# Patient Record
Sex: MALE | Race: WHITE | NOT HISPANIC OR LATINO | URBAN - METROPOLITAN AREA
[De-identification: names, ages, dates, MRNs, and addresses within clinical notes are randomized per-mention and may not be internally consistent; named-entity substitution may affect disease eponyms.]

---

## 2024-05-10 ENCOUNTER — INPATIENT (INPATIENT)
Facility: HOSPITAL | Age: 89
LOS: 7 days | Discharge: HOME | DRG: 392 | End: 2024-05-18
Attending: HOSPITALIST | Admitting: STUDENT IN AN ORGANIZED HEALTH CARE EDUCATION/TRAINING PROGRAM
Payer: COMMERCIAL

## 2024-05-10 VITALS
RESPIRATION RATE: 20 BRPM | SYSTOLIC BLOOD PRESSURE: 207 MMHG | OXYGEN SATURATION: 97 % | HEART RATE: 99 BPM | DIASTOLIC BLOOD PRESSURE: 87 MMHG | HEIGHT: 67 IN | WEIGHT: 141.1 LBS | TEMPERATURE: 99 F

## 2024-05-10 DIAGNOSIS — N17.9 ACUTE KIDNEY FAILURE, UNSPECIFIED: ICD-10-CM

## 2024-05-10 DIAGNOSIS — R10.9 UNSPECIFIED ABDOMINAL PAIN: ICD-10-CM

## 2024-05-10 DIAGNOSIS — E87.5 HYPERKALEMIA: ICD-10-CM

## 2024-05-10 LAB
ALBUMIN SERPL ELPH-MCNC: 3.7 G/DL — SIGNIFICANT CHANGE UP (ref 3.3–5)
ALP SERPL-CCNC: 83 U/L — SIGNIFICANT CHANGE UP (ref 40–120)
ALT FLD-CCNC: 12 U/L — SIGNIFICANT CHANGE UP (ref 10–45)
ANION GAP SERPL CALC-SCNC: 17 MMOL/L — SIGNIFICANT CHANGE UP (ref 5–17)
ANION GAP SERPL CALC-SCNC: 20 MMOL/L — HIGH (ref 5–17)
APPEARANCE UR: CLEAR — SIGNIFICANT CHANGE UP
AST SERPL-CCNC: 17 U/L — SIGNIFICANT CHANGE UP (ref 10–40)
BACTERIA # UR AUTO: NEGATIVE /HPF — SIGNIFICANT CHANGE UP
BASE EXCESS BLDV CALC-SCNC: -3.3 MMOL/L — LOW (ref -2–3)
BASOPHILS # BLD AUTO: 0 K/UL — SIGNIFICANT CHANGE UP (ref 0–0.2)
BASOPHILS NFR BLD AUTO: 0 % — SIGNIFICANT CHANGE UP (ref 0–2)
BILIRUB SERPL-MCNC: 1 MG/DL — SIGNIFICANT CHANGE UP (ref 0.2–1.2)
BILIRUB UR-MCNC: NEGATIVE — SIGNIFICANT CHANGE UP
BUN SERPL-MCNC: 77 MG/DL — HIGH (ref 7–23)
BUN SERPL-MCNC: 78 MG/DL — HIGH (ref 7–23)
CA-I SERPL-SCNC: 1.2 MMOL/L — SIGNIFICANT CHANGE UP (ref 1.15–1.33)
CALCIUM SERPL-MCNC: 9.3 MG/DL — SIGNIFICANT CHANGE UP (ref 8.4–10.5)
CALCIUM SERPL-MCNC: 9.7 MG/DL — SIGNIFICANT CHANGE UP (ref 8.4–10.5)
CAST: 2 /LPF — SIGNIFICANT CHANGE UP (ref 0–4)
CHLORIDE BLDV-SCNC: 97 MMOL/L — SIGNIFICANT CHANGE UP (ref 96–108)
CHLORIDE SERPL-SCNC: 96 MMOL/L — SIGNIFICANT CHANGE UP (ref 96–108)
CHLORIDE SERPL-SCNC: 99 MMOL/L — SIGNIFICANT CHANGE UP (ref 96–108)
CO2 BLDV-SCNC: 24 MMOL/L — SIGNIFICANT CHANGE UP (ref 22–26)
CO2 SERPL-SCNC: 16 MMOL/L — LOW (ref 22–31)
CO2 SERPL-SCNC: 16 MMOL/L — LOW (ref 22–31)
COLOR SPEC: YELLOW — SIGNIFICANT CHANGE UP
CREAT SERPL-MCNC: 5 MG/DL — HIGH (ref 0.5–1.3)
CREAT SERPL-MCNC: 5.06 MG/DL — HIGH (ref 0.5–1.3)
DIFF PNL FLD: ABNORMAL
EGFR: 10 ML/MIN/1.73M2 — LOW
EGFR: 11 ML/MIN/1.73M2 — LOW
EOSINOPHIL # BLD AUTO: 0 K/UL — SIGNIFICANT CHANGE UP (ref 0–0.5)
EOSINOPHIL NFR BLD AUTO: 0 % — SIGNIFICANT CHANGE UP (ref 0–6)
GAS PNL BLDV: 128 MMOL/L — LOW (ref 136–145)
GAS PNL BLDV: SIGNIFICANT CHANGE UP
GLUCOSE BLDV-MCNC: 136 MG/DL — HIGH (ref 70–99)
GLUCOSE SERPL-MCNC: 130 MG/DL — HIGH (ref 70–99)
GLUCOSE SERPL-MCNC: 132 MG/DL — HIGH (ref 70–99)
GLUCOSE UR QL: NEGATIVE MG/DL — SIGNIFICANT CHANGE UP
HCO3 BLDV-SCNC: 23 MMOL/L — SIGNIFICANT CHANGE UP (ref 22–29)
HCT VFR BLD CALC: 53.5 % — HIGH (ref 39–50)
HCT VFR BLDA CALC: 55 % — HIGH (ref 39–51)
HGB BLD CALC-MCNC: 18.3 G/DL — HIGH (ref 12.6–17.4)
HGB BLD-MCNC: 17.7 G/DL — HIGH (ref 13–17)
KETONES UR-MCNC: ABNORMAL MG/DL
LACTATE BLDV-MCNC: 1.5 MMOL/L — SIGNIFICANT CHANGE UP (ref 0.5–2)
LEUKOCYTE ESTERASE UR-ACNC: NEGATIVE — SIGNIFICANT CHANGE UP
LIDOCAIN IGE QN: 22 U/L — SIGNIFICANT CHANGE UP (ref 7–60)
LYMPHOCYTES # BLD AUTO: 0.14 K/UL — LOW (ref 1–3.3)
LYMPHOCYTES # BLD AUTO: 0.9 % — LOW (ref 13–44)
MANUAL SMEAR VERIFICATION: SIGNIFICANT CHANGE UP
MCHC RBC-ENTMCNC: 28.7 PG — SIGNIFICANT CHANGE UP (ref 27–34)
MCHC RBC-ENTMCNC: 33.1 GM/DL — SIGNIFICANT CHANGE UP (ref 32–36)
MCV RBC AUTO: 86.7 FL — SIGNIFICANT CHANGE UP (ref 80–100)
MONOCYTES # BLD AUTO: 1.06 K/UL — HIGH (ref 0–0.9)
MONOCYTES NFR BLD AUTO: 7 % — SIGNIFICANT CHANGE UP (ref 2–14)
NEUTROPHILS # BLD AUTO: 13.9 K/UL — HIGH (ref 1.8–7.4)
NEUTROPHILS NFR BLD AUTO: 91.2 % — HIGH (ref 43–77)
NEUTS BAND # BLD: 0.9 % — SIGNIFICANT CHANGE UP (ref 0–8)
NITRITE UR-MCNC: NEGATIVE — SIGNIFICANT CHANGE UP
PCO2 BLDV: 43 MMHG — SIGNIFICANT CHANGE UP (ref 42–55)
PH BLDV: 7.33 — SIGNIFICANT CHANGE UP (ref 7.32–7.43)
PH UR: 5.5 — SIGNIFICANT CHANGE UP (ref 5–8)
PLAT MORPH BLD: ABNORMAL
PLATELET # BLD AUTO: 174 K/UL — SIGNIFICANT CHANGE UP (ref 150–400)
PO2 BLDV: 19 MMHG — LOW (ref 25–45)
POTASSIUM BLDV-SCNC: 5.5 MMOL/L — HIGH (ref 3.5–5.1)
POTASSIUM SERPL-MCNC: 4.8 MMOL/L — SIGNIFICANT CHANGE UP (ref 3.5–5.3)
POTASSIUM SERPL-MCNC: 5.6 MMOL/L — HIGH (ref 3.5–5.3)
POTASSIUM SERPL-SCNC: 4.8 MMOL/L — SIGNIFICANT CHANGE UP (ref 3.5–5.3)
POTASSIUM SERPL-SCNC: 5.6 MMOL/L — HIGH (ref 3.5–5.3)
PROT SERPL-MCNC: 7.9 G/DL — SIGNIFICANT CHANGE UP (ref 6–8.3)
PROT UR-MCNC: 300 MG/DL
RBC # BLD: 6.17 M/UL — HIGH (ref 4.2–5.8)
RBC # FLD: 13.6 % — SIGNIFICANT CHANGE UP (ref 10.3–14.5)
RBC BLD AUTO: SIGNIFICANT CHANGE UP
RBC CASTS # UR COMP ASSIST: 18 /HPF — HIGH (ref 0–4)
SAO2 % BLDV: 32.5 % — LOW (ref 67–88)
SODIUM SERPL-SCNC: 132 MMOL/L — LOW (ref 135–145)
SODIUM SERPL-SCNC: 132 MMOL/L — LOW (ref 135–145)
SP GR SPEC: 1.01 — SIGNIFICANT CHANGE UP (ref 1–1.03)
SQUAMOUS # UR AUTO: 1 /HPF — SIGNIFICANT CHANGE UP (ref 0–5)
UROBILINOGEN FLD QL: 0.2 MG/DL — SIGNIFICANT CHANGE UP (ref 0.2–1)
WBC # BLD: 15.09 K/UL — HIGH (ref 3.8–10.5)
WBC # FLD AUTO: 15.09 K/UL — HIGH (ref 3.8–10.5)
WBC UR QL: 2 /HPF — SIGNIFICANT CHANGE UP (ref 0–5)

## 2024-05-10 PROCEDURE — 99285 EMERGENCY DEPT VISIT HI MDM: CPT

## 2024-05-10 PROCEDURE — 74176 CT ABD & PELVIS W/O CONTRAST: CPT | Mod: 26,MC

## 2024-05-10 PROCEDURE — 99223 1ST HOSP IP/OBS HIGH 75: CPT

## 2024-05-10 RX ORDER — ACETAMINOPHEN 500 MG
1000 TABLET ORAL ONCE
Refills: 0 | Status: COMPLETED | OUTPATIENT
Start: 2024-05-10 | End: 2024-05-10

## 2024-05-10 RX ORDER — LIDOCAINE HCL 20 MG/ML
5 VIAL (ML) INJECTION ONCE
Refills: 0 | Status: COMPLETED | OUTPATIENT
Start: 2024-05-10 | End: 2024-05-10

## 2024-05-10 RX ORDER — DEXTROSE 50 % IN WATER 50 %
25 SYRINGE (ML) INTRAVENOUS ONCE
Refills: 0 | Status: COMPLETED | OUTPATIENT
Start: 2024-05-10 | End: 2024-05-10

## 2024-05-10 RX ORDER — SODIUM ZIRCONIUM CYCLOSILICATE 10 G/10G
10 POWDER, FOR SUSPENSION ORAL ONCE
Refills: 0 | Status: COMPLETED | OUTPATIENT
Start: 2024-05-10 | End: 2024-05-10

## 2024-05-10 RX ORDER — SODIUM CHLORIDE 9 MG/ML
1000 INJECTION, SOLUTION INTRAVENOUS
Refills: 0 | Status: DISCONTINUED | OUTPATIENT
Start: 2024-05-10 | End: 2024-05-10

## 2024-05-10 RX ORDER — SODIUM CHLORIDE 9 MG/ML
1000 INJECTION INTRAMUSCULAR; INTRAVENOUS; SUBCUTANEOUS ONCE
Refills: 0 | Status: DISCONTINUED | OUTPATIENT
Start: 2024-05-10 | End: 2024-05-10

## 2024-05-10 RX ORDER — METRONIDAZOLE 500 MG
500 TABLET ORAL ONCE
Refills: 0 | Status: COMPLETED | OUTPATIENT
Start: 2024-05-10 | End: 2024-05-10

## 2024-05-10 RX ORDER — ALBUTEROL 90 UG/1
2.5 AEROSOL, METERED ORAL ONCE
Refills: 0 | Status: COMPLETED | OUTPATIENT
Start: 2024-05-10 | End: 2024-05-10

## 2024-05-10 RX ORDER — CALCIUM GLUCONATE 100 MG/ML
2 VIAL (ML) INTRAVENOUS ONCE
Refills: 0 | Status: COMPLETED | OUTPATIENT
Start: 2024-05-10 | End: 2024-05-10

## 2024-05-10 RX ORDER — INSULIN HUMAN 100 [IU]/ML
5 INJECTION, SOLUTION SUBCUTANEOUS ONCE
Refills: 0 | Status: COMPLETED | OUTPATIENT
Start: 2024-05-10 | End: 2024-05-10

## 2024-05-10 RX ORDER — CIPROFLOXACIN LACTATE 400MG/40ML
400 VIAL (ML) INTRAVENOUS ONCE
Refills: 0 | Status: COMPLETED | OUTPATIENT
Start: 2024-05-10 | End: 2024-05-10

## 2024-05-10 RX ORDER — SODIUM CHLORIDE 9 MG/ML
500 INJECTION INTRAMUSCULAR; INTRAVENOUS; SUBCUTANEOUS ONCE
Refills: 0 | Status: COMPLETED | OUTPATIENT
Start: 2024-05-10 | End: 2024-05-10

## 2024-05-10 RX ADMIN — Medication 5 MILLILITER(S): at 22:30

## 2024-05-10 RX ADMIN — Medication 200 MILLIGRAM(S): at 20:01

## 2024-05-10 RX ADMIN — ALBUTEROL 2.5 MILLIGRAM(S): 90 AEROSOL, METERED ORAL at 20:38

## 2024-05-10 RX ADMIN — SODIUM CHLORIDE 500 MILLILITER(S): 9 INJECTION INTRAMUSCULAR; INTRAVENOUS; SUBCUTANEOUS at 20:00

## 2024-05-10 RX ADMIN — Medication 25 MILLILITER(S): at 20:27

## 2024-05-10 RX ADMIN — Medication 200 GRAM(S): at 20:25

## 2024-05-10 RX ADMIN — Medication 100 MILLIGRAM(S): at 21:06

## 2024-05-10 RX ADMIN — SODIUM ZIRCONIUM CYCLOSILICATE 10 GRAM(S): 10 POWDER, FOR SUSPENSION ORAL at 20:26

## 2024-05-10 RX ADMIN — Medication 400 MILLIGRAM(S): at 21:13

## 2024-05-10 RX ADMIN — Medication 1000 MILLIGRAM(S): at 23:16

## 2024-05-10 RX ADMIN — INSULIN HUMAN 5 UNIT(S): 100 INJECTION, SOLUTION SUBCUTANEOUS at 20:29

## 2024-05-10 NOTE — ED PROVIDER NOTE - OBJECTIVE STATEMENT
89-year-old male with a history of hyperlipidemia, hypertension, presented with right upper quadrant pain, radiating to the right flank area nausea for the last 4 days,  he denies any history of gallbladder disease still has appendix , no surgeries before , no problem urinating

## 2024-05-10 NOTE — CONSULT NOTE ADULT - PROBLEM SELECTOR RECOMMENDATION 2
Pt. with hyperkalemia in the setting of obstructive uropathy. Serum potassium is elevated at 5.6. Castro being placed as noted above. Recommend Lokelma 10gm TID for 24 hours. Start pt. on D5W + 150 mEq of bicarb at 75ccs/hr. IVFs can be discontinued if urine output is minimal and there are increases in oxygen requirements. Recheck BMP now with above labs. Monitor SCO2.     If you have any questions, please feel free to contact me.  Fish Austin MD  Nephrology Fellow  [email or web address] / [employer] Teams (Preferred)  (After 4pm or on weekends, please call the on-call Fellow) Pt. with hyperkalemia in the setting of obstructive uropathy. Serum potassium is elevated at 5.6 and improved to 4.8 on repeat. Castro being placed as noted above. Recommend Lokelma 10gm TID for 24 hours to optimize for OR tomorrow. Recheck BMP at midnight. Monitor SCO2.     If you have any questions, please feel free to contact me.  Fish Austin MD  Nephrology Fellow  [id number] / [employer] Teams (Preferred)  (After 4pm or on weekends, please call the on-call Fellow)

## 2024-05-10 NOTE — H&P ADULT - ASSESSMENT
89M  w/pmhx HTN, BPH, HLD, p/w abdominal pain x 4 days ,   labs w/ LIA , imaging w/ R hydronephrosis

## 2024-05-10 NOTE — ED ADULT NURSE NOTE - OBJECTIVE STATEMENT
Pt is a 89y male who presents to [facility] ED from triage c/o of abd pain. Pt is primarily Occitan speaking, family member at bedside translating, endorses about 4 days ago pt developed sudden onset RUQ abd pain which has started to migrate to the epigastric/middle abd region, pt endorses feeling nausea but no episodes of emesis, and decreased appetite due to the nausea. Pt tender in abd region upon palpation. PMH HTN, HLD. Pt is A&Ox4 currently denying any SOB, cough, CP, palpitations, fever/chills, weakness/fatigue. Peripheral IV placed by ED RN in QPA, 20G in R AC and 22G in L hand. Pt placed on CM.

## 2024-05-10 NOTE — CONSULT NOTE ADULT - PROBLEM SELECTOR RECOMMENDATION 9
Pt. with LIA on CKD in the setting of obstructive uropathy from ?mass as seen on CT. No prior labs available for review on [place]. As per pt. his Scr normally ranges between 1.7 to 2. Scr on admission was elevated at 5.06 (5/10). UA with 300mg of protein, trace ketones, large blood, and 18 RBCs. CT abd/pelv on admision showed soft tissue filling defect in the right mid/distal ureter, measuring 1.5 cm, possibly neoplasm, resultant moderate right hydroureteronephrosis, and significant retroperitoneal lymphadenopathy, which is concerning for a lymphoproliferative process such as lymphoma. Castro catheter being placed by Urology in the ED. Tentatively planned for OR in AM for stent vs PCN if medically optimized. Discussed with Urology. Recommend to obtain LDH, uric acid, UPCR, and kidney US. Monitor labs and urine output. Avoid nephrotoxins. Dose medications as per eGFR.

## 2024-05-10 NOTE — CONSULT NOTE ADULT - ASSESSMENT
88y/o M with PMH of HTN, HLD, CKD, BPH s/p TURP 16 years ago at [facility] (doesn't recall urologist), and SCC presents to ED complaining of R flank abdominal pain for the past 4 days but worsening since yesterday.  CT scan with findings of a soft tissue filling defect in the right mid/distal ureter, measuring 1.5 cm, possibly neoplasm, moderate right hydroureteronephrosis, and significant retroperitoneal lymphadenopathy, which is concerning for a lymphoproliferative process such as lymphoma.  Hyperkalemia now corrected with lokelma     -Recommend IR consult for right nephrostomy tube placement  -Ureteral stent placement would be difficult in the setting of a 1.5cm mass, pt would also need medical optimization and clearance to undergo general anesthesia     -Will defer manuel placement at this time since patient is voiding well with acceptable PVR and urinary symptoms   -Continue abx given multiple manuel attempts   -Please send urine culture   -Trend BMP   -Urology will follow, please page if unable to void ([phone])    D/w Dr. Jama Gong [facility]  [address] [place] [phone]

## 2024-05-10 NOTE — H&P ADULT - NSHPREVIEWOFSYSTEMS_GEN_ALL_CORE
CONSTITUTIONAL: No weakness, fevers or chills  EYES/ENT: No visual changes;  No dysphagia  NECK: No pain or stiffness  RESPIRATORY: No cough, wheezing, hemoptysis; No shortness of breath  CARDIOVASCULAR: No chest pain or palpitations; No lower extremity edema  EXTREMITIES: no le edema, cyanosis, clubbing  GASTROINTESTINAL: +  abdominal pain. +  nausea, vomiting, or hematemesis; No diarrhea or constipation. No melena or hematochezia.  BACK: No back pain  GENITOURINARY: No dysuria, frequency or hematuria  NEUROLOGICAL: No numbness or weakness  MSK: no joint swelling or pain  SKIN: No itching, burning, rashes, or lesions   PSYCH: no agitation  All other review of systems is negative unless indicated above.

## 2024-05-10 NOTE — ED PROVIDER NOTE - CLINICAL SUMMARY MEDICAL DECISION MAKING FREE TEXT BOX
89-year-old male with right upper quadrant pain nausea for couple of days, on physical exam tender in the right upper quadrant , elevated white count 15,000,, concern for cholecystitis, pyelonephritis, appendicitis,   will obtain blood work IV hydration, antibiotics, right upper quadrant sonogram to rule out cholecystitis CAT scan of the abdomen and reassess ZR 89-year-old male with right upper quadrant pain nausea for couple of days, on physical exam tender in the right upper quadrant , elevated white count 15,000,, concern for cholecystitis, pyelonephritis,  Pancreatitis ,renal colic   less likely cardiac event ,ECG sinus tack LVH old anterior lateral mi    will obtain blood work IV hydration, antibiotics, right upper quadrant sonogram to rule out cholecystitis CAT scan of the abdomen and reassess ZR

## 2024-05-10 NOTE — ED PROVIDER NOTE - RAPID ASSESSMENT
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 Pain is constant, felt in right side abdomen radiating to right back.  Has never had pain like this before.  Saw primary doctor who sent him in to ED today for evaluation.  Symptoms are associated with nausea but no vomiting.  Additionally had constipation for the last 3 days and feels bloated which is unusual for him, normally has a BM every day.  Denies history of abdominal surgery, chest pain, shortness of breath, hematuria, urgency, dysuria, frequency, numbness/tingling, bowel/bladder incontinence.     **Patient was rapidly assessed by Yohan verdugo Kearney, PA-C. A limited history was obtained. The patient will be seen and further examined and worked up in the main ED and their care will be completed by the main ED team. Receiving team will follow up on labs, analgesia, any clinical imaging, and perform reassessment and disposition of the patient as clinically indicated. All decisions regarding the progression of care will be made at their discretion.

## 2024-05-10 NOTE — H&P ADULT - PROBLEM SELECTOR PLAN 4
Significant retroperitoneal lymphadenopathy, which is concerning for a  lymphoproliferative process such as lymphoma.   - Oncology consult - to be arranged by day team  - LDH/ Uric Acid   - Ddimer/ fibrinogen

## 2024-05-10 NOTE — H&P ADULT - PROBLEM SELECTOR PLAN 2
attempted to place manuel without success , assessed that stent placement would be difficult in the setting of a 1.5cm mass recommends IR consult for PCN   -  consult appreciated, day team to follow up further recommendations   - IR consult , consult order placed , day team to follow up   - Aztreonam , for prophylaxis due to  instrumentation

## 2024-05-10 NOTE — H&P ADULT - HISTORY OF PRESENT ILLNESS
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     Patient reports persistent right upper quadrant pain over the past four days,  radiating to the right flank area, associated with nausea but no vomiting , he also reports bloating and constipation during this time. He reports no fever or chills. 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     Patient reports persistent right upper quadrant pain over the past four days,  radiating to the right flank area, associated with nausea but no vomiting , no exacerbating or reliving factors, he also reports bloating and constipation during this time. He reports no fever or chills. Of note , patient had about 10lb unintended weight loss over the past several months

## 2024-05-10 NOTE — H&P ADULT - NSHPLABSRESULTS_GEN_ALL_CORE
Labs personally reviewed:                          17.7   15.09 )-----------( 174      ( 10 May 2024 17:52 )             53.5     05-10    132<L>  |  99  |  77<H>  ----------------------------<  132<H>  4.8   |  16<L>  |  5.00<H>    Ca    9.3      10 May 2024 19:18    TPro  7.9  /  Alb  3.7  /  TBili  1.0  /  DBili  x   /  AST  17  /  ALT  12  /  AlkPhos  83  05-10        LIVER FUNCTIONS - ( 10 May 2024 17:52 )  Alb: 3.7 g/dL / Pro: 7.9 g/dL / ALK PHOS: 83 U/L / ALT: 12 U/L / AST: 17 U/L / GGT: x             Urinalysis Basic - ( 10 May 2024 19:18 )    Color: x / Appearance: x / SG: x / pH: x  Gluc: 132 mg/dL / Ketone: x  / Bili: x / Urobili: x   Blood: x / Protein: x / Nitrite: x   Leuk Esterase: x / RBC: x / WBC x   Sq Epi: x / Non Sq Epi: x / Bacteria: x      CAPILLARY BLOOD GLUCOSE      POCT Blood Glucose.: 187 mg/dL (10 May 2024 21:08)  POCT Blood Glucose.: 122 mg/dL (10 May 2024 19:57)      Imaging:  CT AP: Soft tissue filling defect in the right mid/distal ureter, measuring 1.5   cm, possibly neoplasm. Resultant moderate right hydroureteronephrosis.    Significant retroperitoneal lymphadenopathy, which is concerning for a   lymphoproliferative process such as lymphoma.      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

## 2024-05-10 NOTE — CONSULT NOTE ADULT - SUBJECTIVE AND OBJECTIVE BOX
[facility] DIVISION OF KIDNEY DISEASES AND HYPERTENSION -- [phone]  -- INITIAL CONSULT NOTE  --------------------------------------------------------------------------------  HPI: 89M with PMH of HTN, HLD, CKD, BPH, and SCC who comes in to the ED complaining of R flank abdominal pain for the past few days. Nephrology was consulted for LIA on CKD.     No prior labs available for review on [place]. As per pt. his Scr normally ranges between 1.7 to 2. Scr on admission was elevated at 5.06 (5/10). Serum potassium is elevated at 5.6. SCO2 is low at 16.     Pt. was seen and evaluated in the ED with family at bedside. Pt. reports abdominal pain (first time) over the past few days that has not improved. He has had a 10lb weight loss over the past year without a decrease in appetite. Currently denies fevers/chills, HA, CP, SOB, dysuria, or LE swelling. As per pt, he has had a kidney US in the past (>10 years ago) and at that time was normal. CT abd/pelv on admision showed soft tissue filling defect in the right mid/distal ureter, measuring 1.5 cm, possibly neoplasm, resultant moderate right hydroureteronephrosis, and significant retroperitoneal lymphadenopathy, which is concerning for a lymphoproliferative process such as lymphoma.    PAST HISTORY  --------------------------------------------------------------------------------  PAST MEDICAL & SURGICAL HISTORY:  Hypertension  HLD (hyperlipidemia)    FAMILY HISTORY:  No family history of kidney disease/dialysis    PAST SOCIAL HISTORY:  Social smoker    ALLERGIES & MEDICATIONS  --------------------------------------------------------------------------------  Allergies  penicillin (Rash)    Intolerances    Standing Inpatient Medicationslidocaine 2% Jelly 5 milliLiter(s) IntraUrethral Once    PRN Inpatient Medications    REVIEW OF SYSTEMS  --------------------------------------------------------------------------------  Gen: No fevers/chills  Skin: No rashes  Head/Eyes/Ears: No HA  Respiratory: No dyspnea, cough  CV: No chest pain  GI: No abdominal pain, diarrhea  : No dysuria, hematuria  MSK: No edema  Heme: No easy bruising or bleeding  Psych: No significant depression    All other systems were reviewed and are negative, except as noted.    VITALS/PHYSICAL EXAM  --------------------------------------------------------------------------------  T(C): 37.1 (05-10-24 @ 16:55), Max: 37.1 (05-10-24 @ 16:55)  HR: 87 (05-10-24 @ 20:10) (87 - 99)  BP: 130/68 (05-10-24 @ 20:10) (130/68 - 207/87)  RR: 17 (05-10-24 @ 20:10) (17 - 20)  SpO2: 96% (05-10-24 @ 20:10) (96% - 97%)  Wt(kg): --  Height (cm): 170.2 (05-10-24 @ 16:55)  Weight (kg): 64 (05-10-24 @ 16:55)  BMI (kg/m2): 22.1 (05-10-24 @ 16:55)  BSA (m2): 1.74 (05-10-24 @ 16:55)    Physical Exam:  Gen: NAD  HEENT: MMM  Pulm: CTA B/L  CV: S1S2  Abd: Distended but compressible, tenderness to palpation of R flank.   Ext: No B/L LE edema  Neuro: Awake  Skin: Warm and dry, petechial rash in LE B/L    LABS/STUDIES  --------------------------------------------------------------------------------              17.7   15.09 >-----------<  174      [05-10-24 @ 17:52]              53.5     132  |  96  |  78  ----------------------------<  130      [05-10-24 @ 17:52]  5.6   |  16  |  5.06        Ca     9.7     [05-10-24 @ 17:52]    TPro  7.9  /  Alb  3.7  /  TBili  1.0  /  DBili  x   /  AST  17  /  ALT  12  /  AlkPhos  83  [05-10-24 @ 17:52]    Creatinine Trend:  SCr 5.06 [05-10 @ 17:52] [facility] DIVISION OF KIDNEY DISEASES AND HYPERTENSION -- [phone]  -- INITIAL CONSULT NOTE  --------------------------------------------------------------------------------  HPI: 89M with PMH of HTN, HLD, CKD, BPH, and SCC who comes in to the ED complaining of R flank abdominal pain for the past few days. Nephrology was consulted for LIA on CKD, hyperkalemia, and metabolic acidosis.    No prior labs available for review on [facility]. As per pt. his Scr normally ranges between 1.7 to 2. Scr on admission was elevated at 5.06 (5/10). Serum potassium is elevated at 5.6. SCO2 is low at 16.     Pt. was seen and evaluated in the ED with family at bedside. Pt. reports abdominal pain (first time) over the past few days that has not improved. He has had a 10lb weight loss over the past year without a decrease in appetite. Currently denies fevers/chills, HA, CP, SOB, dysuria, or LE swelling. As per pt, he has had a kidney US in the past (>10 years ago) and at that time was normal. CT abd/pelv on admision showed soft tissue filling defect in the right mid/distal ureter, measuring 1.5 cm, possibly neoplasm, resultant moderate right hydroureteronephrosis, and significant retroperitoneal lymphadenopathy, which is concerning for a lymphoproliferative process such as lymphoma.    PAST HISTORY  --------------------------------------------------------------------------------  PAST MEDICAL & SURGICAL HISTORY:  Hypertension  HLD (hyperlipidemia)    FAMILY HISTORY:  No family history of kidney disease/dialysis    PAST SOCIAL HISTORY:  Social smoker    ALLERGIES & MEDICATIONS  --------------------------------------------------------------------------------  Allergies  penicillin (Rash)  Intolerances    Standing Inpatient Medicationslidocaine 2% Jelly 5 milliLiter(s) IntraUrethral Once    PRN Inpatient Medications    REVIEW OF SYSTEMS  --------------------------------------------------------------------------------  All other systems were reviewed and are negative, except as noted.    VITALS/PHYSICAL EXAM  --------------------------------------------------------------------------------  T(C): 37.1 (05-10-24 @ 16:55), Max: 37.1 (05-10-24 @ 16:55)  HR: 87 (05-10-24 @ 20:10) (87 - 99)  BP: 130/68 (05-10-24 @ 20:10) (130/68 - 207/87)  RR: 17 (05-10-24 @ 20:10) (17 - 20)  SpO2: 96% (05-10-24 @ 20:10) (96% - 97%)  Wt(kg): --  Height (cm): 170.2 (05-10-24 @ 16:55)  Weight (kg): 64 (05-10-24 @ 16:55)  BMI (kg/m2): 22.1 (05-10-24 @ 16:55)  BSA (m2): 1.74 (05-10-24 @ 16:55)    Physical Exam:  Gen: NAD  HEENT: MMM  Pulm: CTA B/L  CV: S1S2  Abd: Distended but compressible, tenderness to palpation of R flank.   Ext: No B/L LE edema  Neuro: Awake  Skin: Warm and dry, petechial rash in LE B/L    LABS/STUDIES  --------------------------------------------------------------------------------              17.7   15.09 >-----------<  174      [05-10-24 @ 17:52]              53.5     132  |  96  |  78  ----------------------------<  130      [05-10-24 @ 17:52]  5.6   |  16  |  5.06        Ca     9.7     [05-10-24 @ 17:52]    TPro  7.9  /  Alb  3.7  /  TBili  1.0  /  DBili  x   /  AST  17  /  ALT  12  /  AlkPhos  83  [05-10-24 @ 17:52]    Creatinine Trend:  SCr 5.06 [05-10 @ 17:52]

## 2024-05-10 NOTE — H&P ADULT - PROBLEM SELECTOR PLAN 5
leukocytosis and erythrocytosis , platelets wnl   - monitor cell counts  - Heme/onc consult leukocytosis and erythrocytosis , platelets wnl : low suspicion for infection , UA negative , afebrile , not tachycardic , CT AP w/ no findings suggestive of infection , reports abdominal pain,  otherwise no localizing symptoms s/p cipro/flagy  - continuing abx w/ aztreonam   - monitor cell counts  - Heme/onc consult

## 2024-05-10 NOTE — CONSULT NOTE ADULT - SUBJECTIVE AND OBJECTIVE BOX
HPI: 89y/o M with PMH of HTN, HLD, CKD, BPH s/p TURP 16 years ago at [facility] (doesn't recall urologist), and SCC presents to ED complaining of R flank abdominal pain for the past 4 days but worsening since yesterday. Pain radiates to RLQ and is associated with nausea. CT scan of abd/pelv showed a soft tissue filling defect in the right mid/distal ureter, measuring 1.5 cm, possibly neoplasm, moderate right hydroureteronephrosis, and significant retroperitoneal lymphadenopathy, which is concerning for a lymphoproliferative process such as lymphoma. Left kidney is also atrophic. Labs significant for K 5.6, Na 132 and SCO2 16. Pt reports his baseline SCr is 1.6-1.7.  Nephrology following and treating hyperkalemia.   Pt reports a 10lb weight loss over the past year, his sister at bedside states he has a hearty appetite but has been eating less since yesterday due to pain. Has baseline nocturia 2-3x/night. He denies fever, chills, vomiting, dysuria, urgency, frequency or gross hematuria.     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 Pt is voiding, PVR 330cc.     PAST MEDICAL & SURGICAL HISTORY:  Hypertension    HLD (hyperlipidemia)    FAMILY HISTORY:  No known  malignancy     Social History:  Denies alcohol and drug abuse, nonsmoker, retired      MEDICATIONS  (STANDING):  lidocaine 2% Jelly 5 milliLiter(s) IntraUrethral Once  sodium chloride 0.9% Bolus 1000 milliLiter(s) IV Bolus once    MEDICATIONS  (PRN):    Allergies    penicillin (Rash)    Intolerances    REVIEW OF SYSTEMS: Pertinent positives and negatives as stated in HPI, otherwise negative    Vital signs  T(C): 37.1 (05-10-24 @ 16:55), Max: 37.1 (05-10-24 @ 16:55)  HR: 87 (05-10-24 @ 20:10)  BP: 130/68 (05-10-24 @ 20:10)  SpO2: 96% (05-10-24 @ 20:10)  Wt(kg): --    Physical Exam  Gen: NAD  HEENT: normocephalic, atraumatic, no scleral icterus  Pulm:  No respiratory distress, no subcostal retractions, no accessory muscle use   CV: S1 S2, RRR,  no JVD  Abd: Soft, NT, ND, no rebound tenderness or guarding  : Circumcised, no lesions.  No discharge or blood at urethral meatus.  Testes descended bilaterally.  Testes and epididymis nontender bilaterally.   MSK:  No CVAT, Moving all extremities, full ROM in all extremities, No edema present  NEURO: A&Ox3, no focal neurological deficits, CN 2-12 grossly intact  SKIN: warm, dry, no rash.    LABS:  CBC                       17.7   15.09 )-----------( 174      ( 10 May 2024 17:52 )             53.5     BMP   05-10    132<L>  |  99  |  77<H>  ----------------------------<  132<H>  4.8   |  16<L>  |  5.00<H>    Ca    9.3      10 May 2024 19:18    TPro  7.9  /  Alb  3.7  /  TBili  1.0  /  DBili  x   /  AST  17  /  ALT  12  /  AlkPhos  83  05-10    Urinalysis Basic - ( 10 May 2024 19:18 )    Color: x / Appearance: x / SG: x / pH: x  Gluc: 132 mg/dL / Ketone: x  / Bili: x / Urobili: x   Blood: x / Protein: x / Nitrite: x   Leuk Esterase: x / RBC: x / WBC x   Sq Epi: x / Non Sq Epi: x / Bacteria: x    Urine Cx: pending     Radiology:  < from: CT Abdomen and Pelvis No Cont (05.10.24 @ 19:28) >  IMPRESSION:  Soft tissue filling defect in the right mid/distal ureter, measuring 1.5   cm, possibly neoplasm. Resultant moderate right hydroureteronephrosis.    Significant retroperitoneal lymphadenopathy, which is concerning for a   lymphoproliferative process such as lymphoma.    --- End of Report ---     < end of copied text >

## 2024-05-10 NOTE — H&P ADULT - PROBLEM SELECTOR PLAN 3
s/p lokelma  , Insulin and dextrose , and IV fluid bolus , repeat k 4.8   - monitor serum K   - additional Lokelma  can be used PRN

## 2024-05-10 NOTE — H&P ADULT - PROBLEM SELECTOR PLAN 1
suspect 2/2 obstructive renal  - renal consult appreciated, day team to follow up further recommendations  - renal dose medications  - monitor ins and outs  - monitor creatinine   - avoid nephrotoxins

## 2024-05-10 NOTE — H&P ADULT - TIME BILLING
Chart review , case discussion with other provider , obtain translated history ,  examination of patient , answering questions and concerns , ordering labs and medications , and documentation

## 2024-05-10 NOTE — H&P ADULT - NSHPPHYSICALEXAM_GEN_ALL_CORE
Vital Signs Last 24 Hrs  T(C): 37.1 (10 May 2024 16:55), Max: 37.1 (10 May 2024 16:55)  T(F): 98.7 (10 May 2024 16:55), Max: 98.7 (10 May 2024 16:55)  HR: 87 (10 May 2024 20:10) (87 - 99)  BP: 130/68 (10 May 2024 20:10) (130/68 - 207/87)  BP(mean): 88 (10 May 2024 20:10) (88 - 88)  RR: 17 (10 May 2024 20:10) (17 - 20)  SpO2: 96% (10 May 2024 20:10) (96% - 97%)    Parameters below as of 10 May 2024 20:10  Patient On (Oxygen Delivery Method): room air Vital Signs Last 24 Hrs  T(C): 37.1 (10 May 2024 16:55), Max: 37.1 (10 May 2024 16:55)  T(F): 98.7 (10 May 2024 16:55), Max: 98.7 (10 May 2024 16:55)  HR: 87 (10 May 2024 20:10) (87 - 99)  BP: 130/68 (10 May 2024 20:10) (130/68 - 207/87)  BP(mean): 88 (10 May 2024 20:10) (88 - 88)  RR: 17 (10 May 2024 20:10) (17 - 20)  SpO2: 96% (10 May 2024 20:10) (96% - 97%)    Parameters below as of 10 May 2024 20:10  Patient On (Oxygen Delivery Method): room air    GENERAL: No acute distress, well-developed  HEAD:  Atraumatic, Normocephalic  ENT: EOMI, PERRLA, conjunctiva and sclera clear,  moist mucosa no pharyngeal erythema or exudates   NECK: supple , no JVD   CHEST/LUNG: Clear to auscultation bilaterally; No wheeze, equal breath sounds bilaterally   BACK: No spinal tenderness,  No CVA tenderness   HEART: Regular rate and rhythm; No murmurs, rubs, or gallops  ABDOMEN: Soft, Nontender, mildly distended; Bowel sounds present  EXTREMITIES:  No clubbing, cyanosis, or edema  MSK: No joint swelling or effusions, ROM intact   PSYCH: Normal behavior/affect  NEUROLOGY: AAOx3, non-focal, cranial nerves intact  SKIN: petechial rash on b/l LE , otherwise Normal color, No rashes or lesions

## 2024-05-11 DIAGNOSIS — I10 ESSENTIAL (PRIMARY) HYPERTENSION: ICD-10-CM

## 2024-05-11 DIAGNOSIS — Z98.890 OTHER SPECIFIED POSTPROCEDURAL STATES: Chronic | ICD-10-CM

## 2024-05-11 DIAGNOSIS — Z79.899 OTHER LONG TERM (CURRENT) DRUG THERAPY: ICD-10-CM

## 2024-05-11 DIAGNOSIS — N40.0 BENIGN PROSTATIC HYPERPLASIA WITHOUT LOWER URINARY TRACT SYMPTOMS: ICD-10-CM

## 2024-05-11 DIAGNOSIS — N13.30 UNSPECIFIED HYDRONEPHROSIS: ICD-10-CM

## 2024-05-11 DIAGNOSIS — Z29.9 ENCOUNTER FOR PROPHYLACTIC MEASURES, UNSPECIFIED: ICD-10-CM

## 2024-05-11 DIAGNOSIS — R59.0 LOCALIZED ENLARGED LYMPH NODES: ICD-10-CM

## 2024-05-11 DIAGNOSIS — N40.1 BENIGN PROSTATIC HYPERPLASIA WITH LOWER URINARY TRACT SYMPTOMS: ICD-10-CM

## 2024-05-11 DIAGNOSIS — D72.829 ELEVATED WHITE BLOOD CELL COUNT, UNSPECIFIED: ICD-10-CM

## 2024-05-11 LAB
ALBUMIN SERPL ELPH-MCNC: 3.2 G/DL — LOW (ref 3.3–5)
ALP SERPL-CCNC: 71 U/L — SIGNIFICANT CHANGE UP (ref 40–120)
ALT FLD-CCNC: 8 U/L — LOW (ref 10–45)
ANION GAP SERPL CALC-SCNC: 20 MMOL/L — HIGH (ref 5–17)
APTT BLD: 27.8 SEC — SIGNIFICANT CHANGE UP (ref 24.5–35.6)
AST SERPL-CCNC: 14 U/L — SIGNIFICANT CHANGE UP (ref 10–40)
BASOPHILS # BLD AUTO: 0.03 K/UL — SIGNIFICANT CHANGE UP (ref 0–0.2)
BASOPHILS NFR BLD AUTO: 0.3 % — SIGNIFICANT CHANGE UP (ref 0–2)
BILIRUB SERPL-MCNC: 1 MG/DL — SIGNIFICANT CHANGE UP (ref 0.2–1.2)
BUN SERPL-MCNC: 80 MG/DL — HIGH (ref 7–23)
CALCIUM SERPL-MCNC: 9.4 MG/DL — SIGNIFICANT CHANGE UP (ref 8.4–10.5)
CHLORIDE SERPL-SCNC: 99 MMOL/L — SIGNIFICANT CHANGE UP (ref 96–108)
CO2 SERPL-SCNC: 13 MMOL/L — LOW (ref 22–31)
CREAT SERPL-MCNC: 5.21 MG/DL — HIGH (ref 0.5–1.3)
CULTURE RESULTS: SIGNIFICANT CHANGE UP
D DIMER BLD IA.RAPID-MCNC: 649 NG/ML DDU — HIGH
EGFR: 10 ML/MIN/1.73M2 — LOW
EOSINOPHIL # BLD AUTO: 0.01 K/UL — SIGNIFICANT CHANGE UP (ref 0–0.5)
EOSINOPHIL NFR BLD AUTO: 0.1 % — SIGNIFICANT CHANGE UP (ref 0–6)
FSP PPP-MCNC: < 5 UG/ML — SIGNIFICANT CHANGE UP
GLUCOSE SERPL-MCNC: 109 MG/DL — HIGH (ref 70–99)
HCT VFR BLD CALC: 48.6 % — SIGNIFICANT CHANGE UP (ref 39–50)
HGB BLD-MCNC: 16.4 G/DL — SIGNIFICANT CHANGE UP (ref 13–17)
IMM GRANULOCYTES NFR BLD AUTO: 0.5 % — SIGNIFICANT CHANGE UP (ref 0–0.9)
INR BLD: 1.16 RATIO — SIGNIFICANT CHANGE UP (ref 0.85–1.18)
LDH SERPL L TO P-CCNC: 284 U/L — HIGH (ref 50–242)
LDH SERPL L TO P-CCNC: 309 U/L — HIGH (ref 50–242)
LYMPHOCYTES # BLD AUTO: 0.44 K/UL — LOW (ref 1–3.3)
LYMPHOCYTES # BLD AUTO: 4.3 % — LOW (ref 13–44)
MCHC RBC-ENTMCNC: 29.1 PG — SIGNIFICANT CHANGE UP (ref 27–34)
MCHC RBC-ENTMCNC: 33.7 GM/DL — SIGNIFICANT CHANGE UP (ref 32–36)
MCV RBC AUTO: 86.3 FL — SIGNIFICANT CHANGE UP (ref 80–100)
MONOCYTES # BLD AUTO: 0.95 K/UL — HIGH (ref 0–0.9)
MONOCYTES NFR BLD AUTO: 9.2 % — SIGNIFICANT CHANGE UP (ref 2–14)
NEUTROPHILS # BLD AUTO: 8.84 K/UL — HIGH (ref 1.8–7.4)
NEUTROPHILS NFR BLD AUTO: 85.6 % — HIGH (ref 43–77)
NRBC # BLD: 0 /100 WBCS — SIGNIFICANT CHANGE UP (ref 0–0)
PLATELET # BLD AUTO: 152 K/UL — SIGNIFICANT CHANGE UP (ref 150–400)
POTASSIUM SERPL-MCNC: 4.6 MMOL/L — SIGNIFICANT CHANGE UP (ref 3.5–5.3)
POTASSIUM SERPL-SCNC: 4.6 MMOL/L — SIGNIFICANT CHANGE UP (ref 3.5–5.3)
PROT SERPL-MCNC: 6.7 G/DL — SIGNIFICANT CHANGE UP (ref 6–8.3)
PROTHROM AB SERPL-ACNC: 12.1 SEC — SIGNIFICANT CHANGE UP (ref 9.5–13)
RBC # BLD: 5.63 M/UL — SIGNIFICANT CHANGE UP (ref 4.2–5.8)
RBC # FLD: 13.2 % — SIGNIFICANT CHANGE UP (ref 10.3–14.5)
SODIUM SERPL-SCNC: 132 MMOL/L — LOW (ref 135–145)
SPECIMEN SOURCE: SIGNIFICANT CHANGE UP
URATE SERPL-MCNC: 10.1 MG/DL — HIGH (ref 3.4–8.8)
URATE SERPL-MCNC: 9 MG/DL — HIGH (ref 3.4–8.8)
WBC # BLD: 10.32 K/UL — SIGNIFICANT CHANGE UP (ref 3.8–10.5)
WBC # FLD AUTO: 10.32 K/UL — SIGNIFICANT CHANGE UP (ref 3.8–10.5)

## 2024-05-11 PROCEDURE — 99233 SBSQ HOSP IP/OBS HIGH 50: CPT

## 2024-05-11 PROCEDURE — 76700 US EXAM ABDOM COMPLETE: CPT | Mod: 26

## 2024-05-11 PROCEDURE — 99231 SBSQ HOSP IP/OBS SF/LOW 25: CPT

## 2024-05-11 PROCEDURE — 99222 1ST HOSP IP/OBS MODERATE 55: CPT

## 2024-05-11 RX ORDER — ATORVASTATIN CALCIUM 80 MG/1
20 TABLET, FILM COATED ORAL AT BEDTIME
Refills: 0 | Status: DISCONTINUED | OUTPATIENT
Start: 2024-05-11 | End: 2024-05-18

## 2024-05-11 RX ORDER — AZTREONAM 2 G
1000 VIAL (EA) INJECTION
Refills: 0 | Status: DISCONTINUED | OUTPATIENT
Start: 2024-05-11 | End: 2024-05-12

## 2024-05-11 RX ORDER — HEPARIN SODIUM 5000 [USP'U]/ML
5000 INJECTION INTRAVENOUS; SUBCUTANEOUS EVERY 12 HOURS
Refills: 0 | Status: DISCONTINUED | OUTPATIENT
Start: 2024-05-11 | End: 2024-05-11

## 2024-05-11 RX ORDER — LANOLIN ALCOHOL/MO/W.PET/CERES
3 CREAM (GRAM) TOPICAL AT BEDTIME
Refills: 0 | Status: DISCONTINUED | OUTPATIENT
Start: 2024-05-11 | End: 2024-05-18

## 2024-05-11 RX ORDER — ACETAMINOPHEN 500 MG
650 TABLET ORAL EVERY 6 HOURS
Refills: 0 | Status: DISCONTINUED | OUTPATIENT
Start: 2024-05-11 | End: 2024-05-18

## 2024-05-11 RX ORDER — TAMSULOSIN HYDROCHLORIDE 0.4 MG/1
0.4 CAPSULE ORAL AT BEDTIME
Refills: 0 | Status: DISCONTINUED | OUTPATIENT
Start: 2024-05-11 | End: 2024-05-18

## 2024-05-11 RX ORDER — SODIUM ZIRCONIUM CYCLOSILICATE 10 G/10G
10 POWDER, FOR SUSPENSION ORAL EVERY 8 HOURS
Refills: 0 | Status: DISCONTINUED | OUTPATIENT
Start: 2024-05-11 | End: 2024-05-11

## 2024-05-11 RX ORDER — HYDROMORPHONE HYDROCHLORIDE 2 MG/ML
0.2 INJECTION INTRAMUSCULAR; INTRAVENOUS; SUBCUTANEOUS ONCE
Refills: 0 | Status: DISCONTINUED | OUTPATIENT
Start: 2024-05-11 | End: 2024-05-11

## 2024-05-11 RX ORDER — METOPROLOL TARTRATE 50 MG
50 TABLET ORAL DAILY
Refills: 0 | Status: DISCONTINUED | OUTPATIENT
Start: 2024-05-11 | End: 2024-05-18

## 2024-05-11 RX ORDER — AMLODIPINE BESYLATE 2.5 MG/1
5 TABLET ORAL DAILY
Refills: 0 | Status: DISCONTINUED | OUTPATIENT
Start: 2024-05-11 | End: 2024-05-18

## 2024-05-11 RX ORDER — ONDANSETRON 8 MG/1
4 TABLET, FILM COATED ORAL EVERY 8 HOURS
Refills: 0 | Status: DISCONTINUED | OUTPATIENT
Start: 2024-05-11 | End: 2024-05-18

## 2024-05-11 RX ORDER — RASBURICASE 7.5 MG
3 KIT INTRAVENOUS ONCE
Refills: 0 | Status: COMPLETED | OUTPATIENT
Start: 2024-05-11 | End: 2024-05-11

## 2024-05-11 RX ADMIN — ATORVASTATIN CALCIUM 20 MILLIGRAM(S): 80 TABLET, FILM COATED ORAL at 21:17

## 2024-05-11 RX ADMIN — Medication 650 MILLIGRAM(S): at 23:56

## 2024-05-11 RX ADMIN — TAMSULOSIN HYDROCHLORIDE 0.4 MILLIGRAM(S): 0.4 CAPSULE ORAL at 01:16

## 2024-05-11 RX ADMIN — Medication 650 MILLIGRAM(S): at 08:06

## 2024-05-11 RX ADMIN — Medication 50 MILLIGRAM(S): at 11:39

## 2024-05-11 RX ADMIN — HYDROMORPHONE HYDROCHLORIDE 0.2 MILLIGRAM(S): 2 INJECTION INTRAMUSCULAR; INTRAVENOUS; SUBCUTANEOUS at 14:58

## 2024-05-11 RX ADMIN — Medication 650 MILLIGRAM(S): at 23:26

## 2024-05-11 RX ADMIN — TAMSULOSIN HYDROCHLORIDE 0.4 MILLIGRAM(S): 0.4 CAPSULE ORAL at 21:18

## 2024-05-11 RX ADMIN — Medication 3 MILLIGRAM(S): at 21:17

## 2024-05-11 RX ADMIN — AMLODIPINE BESYLATE 5 MILLIGRAM(S): 2.5 TABLET ORAL at 17:17

## 2024-05-11 RX ADMIN — Medication 650 MILLIGRAM(S): at 09:30

## 2024-05-11 RX ADMIN — HYDROMORPHONE HYDROCHLORIDE 0.2 MILLIGRAM(S): 2 INJECTION INTRAMUSCULAR; INTRAVENOUS; SUBCUTANEOUS at 15:28

## 2024-05-11 RX ADMIN — HEPARIN SODIUM 5000 UNIT(S): 5000 INJECTION INTRAVENOUS; SUBCUTANEOUS at 17:18

## 2024-05-11 RX ADMIN — Medication 650 MILLIGRAM(S): at 17:47

## 2024-05-11 RX ADMIN — RASBURICASE 104 MILLIGRAM(S): KIT at 10:46

## 2024-05-11 RX ADMIN — Medication 650 MILLIGRAM(S): at 17:17

## 2024-05-11 RX ADMIN — Medication 50 MILLIGRAM(S): at 17:18

## 2024-05-11 NOTE — PATIENT PROFILE ADULT - FALL HARM RISK - HARM RISK INTERVENTIONS

## 2024-05-11 NOTE — PROGRESS NOTE ADULT - PROBLEM SELECTOR PLAN 4
Significant retroperitoneal lymphadenopathy, which is concerning for a  lymphoproliferative process such as lymphoma.   - Oncology consult team emailed  - uric acid 10.1, given rasburicase 3 mg IV x1 on 5/11  - LDH/ Uric Acid   - Ddimer/ fibrinogen

## 2024-05-11 NOTE — PROCEDURE NOTE - NSURITECHNIQUE_GU_A_CORE
Proper hand hygiene was performed/Sterile gloves were worn for the duration of the procedure/A sterile drape was used to cover all adjacent areas/The site was cleaned with soap/water and sterile solution (betadine)/The catheter was appropriately lubricated/All applicable medical record documentation is completed
Proper hand hygiene was performed/Sterile gloves were worn for the duration of the procedure/A sterile drape was used to cover all adjacent areas/The site was cleaned with soap/water and sterile solution (betadine)/The catheter was appropriately lubricated/The catheter was secured with a securement device (e.g. StatLock)/The urinary drainage system is closed at the end of the procedure/The collection bag is below the level of the patient and urinary bladder/All applicable medical record documentation is completed

## 2024-05-11 NOTE — PROCEDURE NOTE - ADDITIONAL PROCEDURE DETAILS
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 Attempted 18f coude down to 12f silicone.     -Will defer manuel placement as pt is voiding, PVR 330cc  -Continue abx  -F/u urine cx  -Continue flomax 0.4mg
Placed 16F [place] tip catheter without issues. Do not remove manuel before speaking to urology.

## 2024-05-11 NOTE — PROCEDURE NOTE - NSCOMPLICATION_GEN_A_CORE
Admit to CCU for continuous tele montoring  -ECHO today  -Serial cardiac enzymes  -ACS protocol with hep gtt, asa, plavix, lipitor  -hold antihypertensives  -[facility] in am no complications

## 2024-05-11 NOTE — PROGRESS NOTE ADULT - PROBLEM SELECTOR PLAN 1
suspect 2/2 obstructive renal  - renal consult appreciated  - renal dose medications  - monitor ins and outs  - monitor creatinine   - avoid nephrotoxins

## 2024-05-12 DIAGNOSIS — K59.00 CONSTIPATION, UNSPECIFIED: ICD-10-CM

## 2024-05-12 LAB
ANION GAP SERPL CALC-SCNC: 20 MMOL/L — HIGH (ref 5–17)
APTT BLD: 29.5 SEC — SIGNIFICANT CHANGE UP (ref 24.5–35.6)
BLD GP AB SCN SERPL QL: NEGATIVE — SIGNIFICANT CHANGE UP
BUN SERPL-MCNC: 94 MG/DL — HIGH (ref 7–23)
CALCIUM SERPL-MCNC: 9.1 MG/DL — SIGNIFICANT CHANGE UP (ref 8.4–10.5)
CHLORIDE SERPL-SCNC: 99 MMOL/L — SIGNIFICANT CHANGE UP (ref 96–108)
CO2 SERPL-SCNC: 15 MMOL/L — LOW (ref 22–31)
CREAT SERPL-MCNC: 5.88 MG/DL — HIGH (ref 0.5–1.3)
EGFR: 9 ML/MIN/1.73M2 — LOW
GLUCOSE SERPL-MCNC: 117 MG/DL — HIGH (ref 70–99)
HCT VFR BLD CALC: 46.9 % — SIGNIFICANT CHANGE UP (ref 39–50)
HGB BLD-MCNC: 16 G/DL — SIGNIFICANT CHANGE UP (ref 13–17)
INR BLD: 1.07 RATIO — SIGNIFICANT CHANGE UP (ref 0.85–1.18)
LDH SERPL L TO P-CCNC: 251 U/L — HIGH (ref 50–242)
MCHC RBC-ENTMCNC: 29 PG — SIGNIFICANT CHANGE UP (ref 27–34)
MCHC RBC-ENTMCNC: 34.1 GM/DL — SIGNIFICANT CHANGE UP (ref 32–36)
MCV RBC AUTO: 85.1 FL — SIGNIFICANT CHANGE UP (ref 80–100)
NRBC # BLD: 0 /100 WBCS — SIGNIFICANT CHANGE UP (ref 0–0)
PLATELET # BLD AUTO: 184 K/UL — SIGNIFICANT CHANGE UP (ref 150–400)
POTASSIUM SERPL-MCNC: 4.8 MMOL/L — SIGNIFICANT CHANGE UP (ref 3.5–5.3)
POTASSIUM SERPL-SCNC: 4.8 MMOL/L — SIGNIFICANT CHANGE UP (ref 3.5–5.3)
PROTHROM AB SERPL-ACNC: 11.2 SEC — SIGNIFICANT CHANGE UP (ref 9.5–13)
RBC # BLD: 5.51 M/UL — SIGNIFICANT CHANGE UP (ref 4.2–5.8)
RBC # FLD: 13.1 % — SIGNIFICANT CHANGE UP (ref 10.3–14.5)
RH IG SCN BLD-IMP: POSITIVE — SIGNIFICANT CHANGE UP
SODIUM SERPL-SCNC: 134 MMOL/L — LOW (ref 135–145)
URATE SERPL-MCNC: 2.7 MG/DL — LOW (ref 3.4–8.8)
WBC # BLD: 9.71 K/UL — SIGNIFICANT CHANGE UP (ref 3.8–10.5)
WBC # FLD AUTO: 9.71 K/UL — SIGNIFICANT CHANGE UP (ref 3.8–10.5)

## 2024-05-12 PROCEDURE — 99232 SBSQ HOSP IP/OBS MODERATE 35: CPT | Mod: GC

## 2024-05-12 PROCEDURE — 99233 SBSQ HOSP IP/OBS HIGH 50: CPT

## 2024-05-12 PROCEDURE — 99223 1ST HOSP IP/OBS HIGH 75: CPT | Mod: GC

## 2024-05-12 PROCEDURE — 99231 SBSQ HOSP IP/OBS SF/LOW 25: CPT

## 2024-05-12 PROCEDURE — 50432 PLMT NEPHROSTOMY CATHETER: CPT | Mod: RT

## 2024-05-12 RX ORDER — HYDROMORPHONE HYDROCHLORIDE 2 MG/ML
0.5 INJECTION INTRAMUSCULAR; INTRAVENOUS; SUBCUTANEOUS
Refills: 0 | Status: DISCONTINUED | OUTPATIENT
Start: 2024-05-12 | End: 2024-05-12

## 2024-05-12 RX ORDER — POLYETHYLENE GLYCOL 3350 17 G/17G
17 POWDER, FOR SOLUTION ORAL
Refills: 0 | Status: DISCONTINUED | OUTPATIENT
Start: 2024-05-12 | End: 2024-05-18

## 2024-05-12 RX ORDER — SENNA PLUS 8.6 MG/1
2 TABLET ORAL AT BEDTIME
Refills: 0 | Status: DISCONTINUED | OUTPATIENT
Start: 2024-05-12 | End: 2024-05-18

## 2024-05-12 RX ORDER — ONDANSETRON 8 MG/1
4 TABLET, FILM COATED ORAL ONCE
Refills: 0 | Status: DISCONTINUED | OUTPATIENT
Start: 2024-05-12 | End: 2024-05-12

## 2024-05-12 RX ORDER — HYDROMORPHONE HYDROCHLORIDE 2 MG/ML
1 INJECTION INTRAMUSCULAR; INTRAVENOUS; SUBCUTANEOUS
Refills: 0 | Status: DISCONTINUED | OUTPATIENT
Start: 2024-05-12 | End: 2024-05-12

## 2024-05-12 RX ADMIN — POLYETHYLENE GLYCOL 3350 17 GRAM(S): 17 POWDER, FOR SOLUTION ORAL at 19:07

## 2024-05-12 RX ADMIN — SENNA PLUS 2 TABLET(S): 8.6 TABLET ORAL at 22:44

## 2024-05-12 RX ADMIN — Medication 650 MILLIGRAM(S): at 05:18

## 2024-05-12 RX ADMIN — ATORVASTATIN CALCIUM 20 MILLIGRAM(S): 80 TABLET, FILM COATED ORAL at 22:44

## 2024-05-12 RX ADMIN — Medication 650 MILLIGRAM(S): at 16:47

## 2024-05-12 RX ADMIN — AMLODIPINE BESYLATE 5 MILLIGRAM(S): 2.5 TABLET ORAL at 05:15

## 2024-05-12 RX ADMIN — TAMSULOSIN HYDROCHLORIDE 0.4 MILLIGRAM(S): 0.4 CAPSULE ORAL at 22:44

## 2024-05-12 RX ADMIN — Medication 50 MILLIGRAM(S): at 16:00

## 2024-05-12 RX ADMIN — Medication 50 MILLIGRAM(S): at 05:15

## 2024-05-12 RX ADMIN — Medication 650 MILLIGRAM(S): at 05:48

## 2024-05-12 RX ADMIN — Medication 650 MILLIGRAM(S): at 16:52

## 2024-05-12 NOTE — PROGRESS NOTE ADULT - PROBLEM SELECTOR PLAN 7
- DVT ppx: hold, in preparation for IR guided procedure  - GI ppx: none  - Diet: soft and bite sized - DVT ppx: hold, in preparation for IR guided procedure  - GI ppx: none  - Diet: NPO for IR guided procedure  - IVF: maintenance while NPO

## 2024-05-12 NOTE — CONSULT NOTE ADULT - ATTENDING COMMENTS
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   also has significant retroperitoneal lymphadenopathy though on contralateral side.  in this setting feel best to decompress the kidney via a nephrostomy over a cystoscopic approach which is less likely to be successful and require ureteroscopy and significant manipulation etc.  please consult IR
Discussion with patient and his wife. The patient has a clinical diagnosis of lymphoma according to his wife and he has had retroperitoneal lymphadenopathy for at least 2 years. No biopsy has kathryn performed and private Hematologist has recommended observation of the retroperitoneal  lymph nodes. He is now admitted for evaluation of abdominal pain and distension that may be in association with a ureteral mass (? obstructive uropathy-no related to prior adenopathy) . Possible IR directed treatment at reliving urine obstruction is planned. Please notify private hematologist if their service wishes to follow the patient.
I have seen this patient with the fellow and agree with their assessment and plan. I was physically present for significant portions of the evaluation and management (E/M) service provided.  I agree with the above history, physical, and plan which I have reviewed and edited where appropriate.    LIA, acidosis and hyperkalemia all likely due to obstruction from RP lesions. Soft tissue filling defect in the right mid/distal ureter, measuring 1.5   cm, possibly neoplasm. Resultant moderate right hydroureteronephrosis noted. Significant retroperitoneal lymphadenopathy, which is concerning for a lymphoproliferative process such as lymphoma. TLS also in ddx as LDH is high and Uric acid is 9 ( but this could be from LIA as well)    Would prefer PCN over stents given RP mets. IR for bl PCN   Lokelma to keep K down  If PCN will be significantly delayed, may need HD if crt and K continues to worsen  Would give one dose of rasburicase incase component of TLS  Urology eval noted, difficult manuel but making urine    Ruchi Wick MD  Office   Contact me directly via [employer] Teams     (After 5 pm or on weekends please page the on-call fellow/attending, can check [email or web address] for schedule. Login is judah graf, schedule under [facility] medicine, psych, derm)

## 2024-05-12 NOTE — PROGRESS NOTE ADULT - PROBLEM SELECTOR PLAN 4
Significant retroperitoneal lymphadenopathy, which is concerning for a  lymphoproliferative process such as lymphoma.   - Oncology consult team emailed  - uric acid 10.1, given rasburicase 3 mg IV x1 on 5/11 with improvement of uric acid to 2.7  - trend LDH/ Uric Acid   - Ddimer/ fibrinogen  - IR paged regarding LN biopsy Significant retroperitoneal lymphadenopathy, which is concerning for a  lymphoproliferative process such as lymphoma.   - Oncology consult team emailed  - uric acid 10.1, given rasburicase 3 mg IV x1 on 5/11 with improvement of uric acid to 2.7  - trend LDH/ Uric Acid   - Ddimer/ fibrinogen  - IR consult order for LN biopsy in place

## 2024-05-12 NOTE — PROCEDURE NOTE - NSPROCNAME_GEN_A_CORE
Urinary Device Placement
Interventional Radiology
Urinary Device Placement
GREGORY PHELAN  [address] [place] [address] [place] [address]  Phone: [phone]  Fax: [phone]  Follow Up Time: 1-3 days

## 2024-05-12 NOTE — CONSULT NOTE ADULT - SUBJECTIVE AND OBJECTIVE BOX
Hematology Consult Note    HPI as per admitting team:   history obtained  via [employer] phone  ID # [phone]    Patient is an 89 year old male w/pmhx HTN, BPH, HLD, basal and squamous cell carcinomas s/p Mohs presents for abdominal pain for the past few days.     Patient reports persistent right upper quadrant pain over the past four days,  radiating to the right flank area, associated with nausea but no vomiting , no exacerbating or reliving factors, he also reports bloating and constipation during this time. He reports no fever or chills. Of note , patient had about 10lb unintended weight loss over the past several months   (10 May 2024 23:32)        REVIEW OF SYSTEMS:    CONSTITUTIONAL: No weakness, fevers or chills  EYES/ENT: No visual changes;  No vertigo or throat pain   NECK: No pain or stiffness  RESPIRATORY: No cough, wheezing, hemoptysis; No shortness of breath  CARDIOVASCULAR: No chest pain or palpitations  GASTROINTESTINAL: No abdominal or epigastric pain. No nausea, vomiting, or hematemesis; No diarrhea or constipation. No melena or hematochezia.  GENITOURINARY: No dysuria, frequency or hematuria  NEUROLOGICAL: No numbness or weakness  SKIN: No itching, burning, rashes, or lesions   All other review of systems is negative unless indicated above.    PAST MEDICAL & SURGICAL HISTORY:  Hypertension      HLD (hyperlipidemia)      History of prostate surgery          FAMILY HISTORY:      SOCIAL HISTORY:     Allergies    penicillin (Rash)    Intolerances        MEDICATIONS  (STANDING):  amLODIPine   Tablet 5 milliGRAM(s) Oral daily  atorvastatin 20 milliGRAM(s) Oral at bedtime  aztreonam  IVPB 1000 milliGRAM(s) IV Intermittent <User Schedule>  metoprolol succinate ER 50 milliGRAM(s) Oral daily  tamsulosin 0.4 milliGRAM(s) Oral at bedtime    MEDICATIONS  (PRN):  acetaminophen     Tablet .. 650 milliGRAM(s) Oral every 6 hours PRN Temp greater or equal to 38C (100.4F), Mild Pain (1 - 3)  melatonin 3 milliGRAM(s) Oral at bedtime PRN Insomnia  ondansetron Injectable 4 milliGRAM(s) IV Push every 8 hours PRN Nausea and/or Vomiting      OBJECTIVE       T(F): 97.9 (05-12-24 @ 05:07), Max: 97.9 (05-11-24 @ 12:05)  HR: 92 (05-12-24 @ 05:07)  BP: 163/61 (05-12-24 @ 05:07)  RR: 18 (05-12-24 @ 05:07)  SpO2: 94% (05-12-24 @ 05:07)  Wt(kg): --    PHYSICAL EXAM   GENERAL: NAD, well-developed  HEAD:  Atraumatic, Normocephalic  EYES: EOMI, PERRLA, conjunctiva and sclera clear  NECK: Supple, No JVD  CHEST/LUNG: Clear to auscultation bilaterally; No wheeze  HEART: Regular rate and rhythm; No murmurs, rubs, or gallops  ABDOMEN: Soft, Nontender, Nondistended; Bowel sounds present  EXTREMITIES:  2+ Peripheral Pulses, No clubbing, cyanosis, or edema  NEUROLOGY: non-focal  SKIN: No rashes or lesions    LABS:                    16.0   9.71  )-----------( 184      ( 12 May 2024 07:11 )             46.9       05-12    134<L>  |  99  |  94<H>  ----------------------------<  117<H>  4.8   |  15<L>  |  5.88<H>    Ca    9.1      12 May 2024 07:11    TPro  6.7  /  Alb  3.2<L>  /  TBili  1.0  /  DBili  x   /  AST  14  /  ALT  8<L>  /  AlkPhos  71  05-11      Uric Acid: 2.7 mg/dL (05-12 @ 07:11)  Lactate Dehydrogenase, Serum: 251 U/L (05-12 @ 07:11)    RADIOLOGY:   Hematology Consult Note    HPI as per admitting team:   history obtained  via [employer] phone  ID # [id number]    Patient is an 89 year old male w/pmhx HTN, BPH, HLD, basal and squamous cell carcinomas s/p Mohs presents for abdominal pain for the past few days.     Patient reports persistent right upper quadrant pain over the past four days,  radiating to the right flank area, associated with nausea but no vomiting , no exacerbating or reliving factors, he also reports bloating and constipation during this time. He reports no fever or chills. Of note , patient had about 10lb unintended weight loss over the past several months   (10 May 2024 23:32)        REVIEW OF SYSTEMS:    CONSTITUTIONAL: No weakness, fevers or chills  EYES/ENT: No visual changes;  No vertigo or throat pain   NECK: No pain or stiffness  RESPIRATORY: No cough, wheezing, hemoptysis; No shortness of breath  CARDIOVASCULAR: No chest pain or palpitations  GASTROINTESTINAL: + abdominal pain  GENITOURINARY: No dysuria, frequency or hematuria  NEUROLOGICAL: No numbness or weakness  SKIN: No itching, burning, rashes, or lesions   All other review of systems is negative unless indicated above.    PAST MEDICAL & SURGICAL HISTORY:  Hypertension      HLD (hyperlipidemia)      History of prostate surgery      SOCIAL HISTORY:   Denies tobacco, etoh or illicit drug use   Allergies    penicillin (Rash)    Intolerances        MEDICATIONS  (STANDING):  amLODIPine   Tablet 5 milliGRAM(s) Oral daily  atorvastatin 20 milliGRAM(s) Oral at bedtime  aztreonam  IVPB 1000 milliGRAM(s) IV Intermittent <User Schedule>  metoprolol succinate ER 50 milliGRAM(s) Oral daily  tamsulosin 0.4 milliGRAM(s) Oral at bedtime    MEDICATIONS  (PRN):  acetaminophen     Tablet .. 650 milliGRAM(s) Oral every 6 hours PRN Temp greater or equal to 38C (100.4F), Mild Pain (1 - 3)  melatonin 3 milliGRAM(s) Oral at bedtime PRN Insomnia  ondansetron Injectable 4 milliGRAM(s) IV Push every 8 hours PRN Nausea and/or Vomiting      OBJECTIVE       T(F): 97.9 (05-12-24 @ 05:07), Max: 97.9 (05-11-24 @ 12:05)  HR: 92 (05-12-24 @ 05:07)  BP: 163/61 (05-12-24 @ 05:07)  RR: 18 (05-12-24 @ 05:07)  SpO2: 94% (05-12-24 @ 05:07)  Wt(kg): --    PHYSICAL EXAM   GENERAL: NAD, well-developed  HEAD:  Atraumatic, Normocephalic  EYES: EOMI, PERRLA, conjunctiva and sclera clear  NECK: Supple, No JVD  CHEST/LUNG: Clear to auscultation bilaterally; No wheeze  HEART: Regular rate and rhythm; No murmurs, rubs, or gallops  ABDOMEN: Distended, tender, + right sided tenderness,  EXTREMITIES: No clubbing, cyanosis, or edema  NEUROLOGY: non-focal  SKIN: No rashes or lesions    LABS:                    16.0   9.71  )-----------( 184      ( 12 May 2024 07:11 )             46.9       05-12    134<L>  |  99  |  94<H>  ----------------------------<  117<H>  4.8   |  15<L>  |  5.88<H>    Ca    9.1      12 May 2024 07:11    TPro  6.7  /  Alb  3.2<L>  /  TBili  1.0  /  DBili  x   /  AST  14  /  ALT  8<L>  /  AlkPhos  71  05-11      Uric Acid: 2.7 mg/dL (05-12 @ 07:11)  Lactate Dehydrogenase, Serum: 251 U/L (05-12 @ 07:11)    RADIOLOGY:    < from: CT Abdomen and Pelvis No Cont (05.10.24 @ 19:28) >  Soft tissue filling defect in the right mid/distal ureter, measuring 1.5   cm, possibly neoplasm. Resultant moderate right hydroureteronephrosis.    Significant retroperitoneal lymphadenopathy, which is concerning for a   lymphoproliferative process such as lymphoma.    < end of copied text >  < from: US Abdomen Complete (05.11.24 @ 09:58) >    IMPRESSION:  Cholelithiasis without sonographic evidence of acute cholecystitis.   Normal caliber biliary tree.    Moderate right hydroureteronephrosis, as on recent CT. No left   hydronephrosis. Bilateral renal cysts.    Moderately distended urinary bladder containing debris. Correlate with   urinalysis to assess for cystitis.    Marked prostatomegaly with a volume of 120 cc.    < end of copied text >

## 2024-05-12 NOTE — CONSULT NOTE ADULT - ASSESSMENT
89M  w/pmhx HTN, BPH, HLD, p/w abdominal pain x 4 days. He is found to have soft tissue filling defected in right ureter, possible neoplasm, as well as significant RP lymphadenopathy c/f lymphoma. Hematology consulted for further recommendations.    #   89M  w/pmhx HTN, BPH, HLD, p/w abdominal pain x 4 days. He is found to have soft tissue filling defected in right ureter, possible neoplasm, as well as significant RP lymphadenopathy c/f lymphoma. Hematology consulted for further recommendations.    #Soft tissue mass/adenopathy concerning for lympyoma   - CT abd/pelvis: Soft tissue filling defect in the right mid/distal ureter, measuring 1.5   cm, possibly neoplasm. Resultant moderate right hydroureteronephrosis. Significant retroperitoneal lymphadenopathy, which is concerning for a   lymphoproliferative process such as lymphoma.  - Coags/DIC panel normal  - Uric acid 10 s/p 3 mg of rasburicase repeat ~3  - Monitor TLS labs daily  - Recommend IR guided LN bx  - start allopurinol  - Hematology team to follow       Note not finalized until signed by attending.   Please do not hesitate to page with questions.     Valeria Huerta MD  Hematology/Oncology Fellow PGY4  Available on Microsoft Teams   Pager: [phone]  For weekends and evenings (5 pm - 8 am), please page fellow on call.    89M  w/pmhx HTN, BPH, HLD, p/w abdominal pain x 4 days. He is found to have soft tissue filling defected in right ureter, possible neoplasm, as well as significant RP lymphadenopathy c/f lymphoma. Hematology consulted for further recommendations.    #Soft tissue mass/adenopathy concerning for lymphoma  #LIA/Right Hydronephrosis   - Patient with wife at bedside, reports has a KNOWN hx of lymphoma and follows up with Dr. Naveen Trujillo at [facility] however is not on any treatment.   - CT abd/pelvis: Soft tissue filling defect in the right mid/distal ureter, measuring 1.5   cm, possibly neoplasm. Resultant moderate right hydroureteronephrosis. Significant retroperitoneal lymphadenopathy, which is concerning for a   lymphoproliferative process such as lymphoma.  - Coags/DIC panel normal  - Uric acid 10 s/p 3 mg of rasburicase repeat ~3  - Monitor TLS labs daily  - Patient with wife at bedside, reports has a KNOWN hx of lymphoma and follows up with Dr. Naveen Trujillo at [facility] however is not on any treatment. Please reach out to Dr. Trujillo team [phone] to determine if further inpatient workup is warranted   - Would recommend IR guided LN bx and flow cytometry however will defer to Dr. Trujillo to determine extent of workup required inpatient   - start allopurinol 300 mg daily   - Pending IR guided PCN; c/w per urology and primary team  - Heme/onc team to sign off; please recall prn (please reach out to [facility] Dr. Trujillo and if wishes to transfer care to [facility] heme/onc team please recall)    Note not finalized until signed by attending.   Please do not hesitate to page with questions.     Valeria Huerta MD  Hematology/Oncology Fellow PGY4  Available on [employer] Teams   Pager: [phone]  For weekends and evenings (5 pm - 8 am), please page fellow on call.

## 2024-05-12 NOTE — PROGRESS NOTE ADULT - PROBLEM SELECTOR PLAN 1
suspect 2/2 obstructive renal  - creatinine trending up today 5.00 -> 5.21 -> 5.88  - secondary to obstructive process  - renal consult appreciated  - renal dose medications  - monitor ins and outs  - monitor creatinine   - avoid nephrotoxins

## 2024-05-13 ENCOUNTER — TRANSCRIPTION ENCOUNTER (OUTPATIENT)
Age: 89
End: 2024-05-13

## 2024-05-13 LAB
ANION GAP SERPL CALC-SCNC: 14 MMOL/L — SIGNIFICANT CHANGE UP (ref 5–17)
BUN SERPL-MCNC: 88 MG/DL — HIGH (ref 7–23)
CALCIUM SERPL-MCNC: 9 MG/DL — SIGNIFICANT CHANGE UP (ref 8.4–10.5)
CHLORIDE SERPL-SCNC: 104 MMOL/L — SIGNIFICANT CHANGE UP (ref 96–108)
CO2 SERPL-SCNC: 18 MMOL/L — LOW (ref 22–31)
CREAT SERPL-MCNC: 4.33 MG/DL — HIGH (ref 0.5–1.3)
EGFR: 13 ML/MIN/1.73M2 — LOW
GLUCOSE SERPL-MCNC: 82 MG/DL — SIGNIFICANT CHANGE UP (ref 70–99)
HCT VFR BLD CALC: 46.6 % — SIGNIFICANT CHANGE UP (ref 39–50)
HGB BLD-MCNC: 15.4 G/DL — SIGNIFICANT CHANGE UP (ref 13–17)
MCHC RBC-ENTMCNC: 28.6 PG — SIGNIFICANT CHANGE UP (ref 27–34)
MCHC RBC-ENTMCNC: 33 GM/DL — SIGNIFICANT CHANGE UP (ref 32–36)
MCV RBC AUTO: 86.6 FL — SIGNIFICANT CHANGE UP (ref 80–100)
NRBC # BLD: 0 /100 WBCS — SIGNIFICANT CHANGE UP (ref 0–0)
PLATELET # BLD AUTO: 205 K/UL — SIGNIFICANT CHANGE UP (ref 150–400)
POTASSIUM SERPL-MCNC: 5 MMOL/L — SIGNIFICANT CHANGE UP (ref 3.5–5.3)
POTASSIUM SERPL-SCNC: 5 MMOL/L — SIGNIFICANT CHANGE UP (ref 3.5–5.3)
RBC # BLD: 5.38 M/UL — SIGNIFICANT CHANGE UP (ref 4.2–5.8)
RBC # FLD: 13.2 % — SIGNIFICANT CHANGE UP (ref 10.3–14.5)
SODIUM SERPL-SCNC: 136 MMOL/L — SIGNIFICANT CHANGE UP (ref 135–145)
WBC # BLD: 7.02 K/UL — SIGNIFICANT CHANGE UP (ref 3.8–10.5)
WBC # FLD AUTO: 7.02 K/UL — SIGNIFICANT CHANGE UP (ref 3.8–10.5)

## 2024-05-13 PROCEDURE — 99233 SBSQ HOSP IP/OBS HIGH 50: CPT | Mod: GC

## 2024-05-13 PROCEDURE — 99233 SBSQ HOSP IP/OBS HIGH 50: CPT

## 2024-05-13 PROCEDURE — 99231 SBSQ HOSP IP/OBS SF/LOW 25: CPT

## 2024-05-13 RX ADMIN — POLYETHYLENE GLYCOL 3350 17 GRAM(S): 17 POWDER, FOR SOLUTION ORAL at 17:36

## 2024-05-13 RX ADMIN — ATORVASTATIN CALCIUM 20 MILLIGRAM(S): 80 TABLET, FILM COATED ORAL at 22:14

## 2024-05-13 RX ADMIN — Medication 50 MILLIGRAM(S): at 06:43

## 2024-05-13 RX ADMIN — SENNA PLUS 2 TABLET(S): 8.6 TABLET ORAL at 22:13

## 2024-05-13 RX ADMIN — Medication 5 MILLIGRAM(S): at 17:35

## 2024-05-13 RX ADMIN — Medication 650 MILLIGRAM(S): at 22:13

## 2024-05-13 RX ADMIN — AMLODIPINE BESYLATE 5 MILLIGRAM(S): 2.5 TABLET ORAL at 06:43

## 2024-05-13 RX ADMIN — Medication 3 MILLIGRAM(S): at 22:14

## 2024-05-13 RX ADMIN — TAMSULOSIN HYDROCHLORIDE 0.4 MILLIGRAM(S): 0.4 CAPSULE ORAL at 22:14

## 2024-05-13 NOTE — DISCHARGE NOTE PROVIDER - PROVIDER TOKENS
PROVIDER:[TOKEN:[[id number]],FOLLOWUP:[Routine]] PROVIDER:[TOKEN:[[id number]],FOLLOWUP:[Routine]],PROVIDER:[TOKEN:[[id number]]],PROVIDER:[TOKEN:[[id number]]],PROVIDER:[TOKEN:[[id number]]]

## 2024-05-13 NOTE — DISCHARGE NOTE PROVIDER - NSDCCPTREATMENT_GEN_ALL_CORE_FT
PRINCIPAL PROCEDURE  Procedure: Nephrostomy of right kidney  Findings and Treatment: You had a right nephrostomy tube placed by Dr. Martin on 5/10/24 in [facility] Radiology (IR).   Monitor site for any sign or symptoms of infection (painful red skin, green/ yellow foul smelling discharge from the insertion site, fever, chills, leakage around drain site).   Flush drain with 10mL NS daily. If you meet resistance upon flushing, STOP and contact IR.   Empty drainage bag or bulb daily and record output. If there is a sudden decrease in output please contact IR to schedule  an appointment.  Drain care:  -Disconnect tubing (tube attached to bag)  from the catheter (catheter going into skin)  -Clean catheter with alcohol swab  -Twist on the flush syringe to the catheter (be sure to push the air out of syringe)  -Flush catheter with 10mL NS (DO NOT pull back on syringe). If you meet resistance upon flushing, STOP and contact IR.   -Disconnect syringe from catheter and reconnect drainage bag.  Dressing care:  -Wash hands thoroughly with water and soap for at least 20 seconds.   -take off old dressing and clean around drain site with gauze soaked with warm water  -After cleaning the site, dry and replace dressing with a new gauze and tegaderm.   -Place one piece of gauze underneath the drain and another piece of gauze on top of drain.   -Apply tegaderm (clear dressing) on top.  -Change dressing every 3 days or when soiled

## 2024-05-13 NOTE — DISCHARGE NOTE PROVIDER - NSDCMRMEDTOKEN_GEN_ALL_CORE_FT
amLODIPine 5 mg oral tablet: 1 tab(s) orally once a day  atorvastatin 20 mg oral tablet: 1 tab(s) orally once a day  Flomax 0.4 mg oral capsule: 1 cap(s) orally  Lumigan 0.01% ophthalmic solution: 1 drop(s) in each affected eye once a day  metoprolol succinate 50 mg oral tablet, extended release: 1 tab(s) orally once a day   amLODIPine 5 mg oral tablet: 1 tab(s) orally once a day  atorvastatin 20 mg oral tablet: 1 tab(s) orally once a day  Flomax 0.4 mg oral capsule: 1 cap(s) orally  Lumigan 0.01% ophthalmic solution: 1 drop(s) in each affected eye once a day  metoprolol succinate 50 mg oral tablet, extended release: 1 tab(s) orally once a day  polyethylene glycol 3350 oral powder for reconstitution: 17 gram(s) orally  senna leaf extract oral tablet: 2 tab(s) orally once a day (at bedtime)   amLODIPine 5 mg oral tablet: 1 tab(s) orally once a day  atorvastatin 20 mg oral tablet: 1 tab(s) orally once a day  Flomax 0.4 mg oral capsule: 1 cap(s) orally  Lumigan 0.01% ophthalmic solution: 1 drop(s) in each affected eye once a day  melatonin 3 mg oral tablet: 1 tab(s) orally once a day (at bedtime) As needed Insomnia  metoprolol succinate 50 mg oral tablet, extended release: 1 tab(s) orally once a day  polyethylene glycol 3350 oral powder for reconstitution: 17 gram(s) orally once a day at 1800  rolling walker: JACY 99 Days  R54 Debility  senna leaf extract oral tablet: 2 tab(s) orally once a day (at bedtime)

## 2024-05-13 NOTE — CHART NOTE - NSCHARTNOTEFT_GEN_A_CORE
89y/o M with PMH of HTN, HLD, CKD, BPH s/p TURP 16 years ago CT with soft tissue right mid/distal ureter w RP adenopathy, moderate right hydroureteronephrosis, and LIA (left kidney is atrophic), IR consulted for right nephrostomy.    manuel catheter placed today    primary team confirmed today patient is not on AC/ASA at home, was planned for procedure today but patient was not NPO  tentatively plan for  above procedure tomorrow 5/12 pending AM labs/medical optimization for procedure vs improvement of LIA after medical management and relief of bladder outlet obstruction   can put order for IR procedure under Dr Martin.  NPO AMN for sedation  please recheck CBC BMP Coags in AM    IR will follow in AM       Son Thomas MD   Interventional Radiology Chief resident/PGY6  Contact on [employer] Teams for nonemergent issues    - Nonemergent consults:  [facility] order "Consult- Interventional Radiology", no page required  - Emergent issues (pager): [facility] [phone]; [facility] [phone]; [phone]; DO NOT PAGE FOR SCHEDULING QUESTIONS  - Scheduling questions 8am-6pm : [facility] [phone]; [facility] [phone],   - Clinic/outpatient booking: [facility] [phone]; [facility] [phone]
See full note from last night    LIA, acidosis and hyperkalemia all likely due to obstruction from RP lesions. Soft tissue filling defect in the right mid/distal ureter, measuring 1.5   cm, possibly neoplasm. Resultant moderate right hydroureteronephrosis noted. Significant retroperitoneal lymphadenopathy, which is concerning for a lymphoproliferative process such as lymphoma. TLS also in ddx as LDH is high and Uric acid is 9 ( but this could be from LIA as well)    Would prefer PCN over stents given RP mets. IR for bl PCN   Lokelma to keep K down  If PCN will be significantly delayed, may need HD if crt and K continues to worsen  Would give one dose of rasburicase incase component of TLS  Urology eval noted, difficult manuel but making urine    Ruchi Wick MD  Office   Contact me directly via Microsoft Teams     (After 5 pm or on weekends please page the on-call fellow/attending, can check [email or web address] for schedule. Login is judah graf, schedule under [facility] medicine, psych, derm)
88y/o M with PMH of HTN, HLD, CKD, BPH s/p TURP 16 years ago CT with soft tissue right mid/distal ureter w RP adenopathy, moderate right hydroureteronephrosis, now sp r nephrostomy for LIA    IR now also consulted for biopsy for tissue diagnosis.   CT abd/pelvis: Soft tissue filling defect in the right mid/distal ureter, measuring 1.5   cm, possibly neoplasm. Resultant moderate right hydroureteronephrosis. Significant retroperitoneal lymphadenopathy, which is concerning for a   lymphoproliferative process such as lymphoma.    seen by heme onc today:  "- Patient with wife at bedside, reports has a KNOWN hx of lymphoma and follows up with Dr. Naveen Trujillo at [facility] however is not on any treatment.   Please reach out to Dr. Trujillo team [phone] to determine if further inpatient workup is warranted   - Would recommend IR guided LN bx and flow cytometry however will defer to Dr. Trujillo to determine extent of workup required inpatient  "      Please followup if inpatient workup is within goals of patient and family and outpatient oncology team. If this is the case, would recommend completing metastatic workup for CT chest prior to determining optimal site for biopsy.      If inpatient workup prior to dc is within patient goals and op oncologist plans, and CT chest is completed, can reconsult IR for biopsy for tissue diagnosis.    Son Thomas MD   Interventional Radiology Chief resident/PGY6  Contact on [employer] Teams for nonemergent issues    - Nonemergent consults:  [facility] order "Consult- Interventional Radiology", no page required  - Emergent issues (pager): [facility] [phone]; [facility] [phone]; [phone]; DO NOT PAGE FOR SCHEDULING QUESTIONS  - Scheduling questions 8am-6pm : [facility] [phone]; [facility] [phone],   - Clinic/outpatient booking: [facility] [phone]; [facility] [phone]
88y/o M with PMH of HTN, HLD, CKD, BPH s/p TURP 16 years ago at [facility] (doesn't recall urologist), and SCC presents to ED complaining of R flank abdominal pain for the past 4 days but worsening since yesterday.  CT scan with findings of a soft tissue filling defect in the right mid/distal ureter, measuring 1.5 cm, possibly neoplasm, moderate right hydroureteronephrosis, and significant retroperitoneal lymphadenopathy, which is concerning for a lymphoproliferative process such as lymphoma.  Hyperkalemia now corrected with lokelma     s/p R NT yday 5/12. Now with downtrending Cr.     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   -At this time, important for Cr to downtrend, when acceptable, will need further imaging- specifically, CT urogram (with IV contrast, delayed imaging) to better characterize the ureteral lesion). This study must be done prior to plans for biopsy. If Cr downtrends well and patient remains inpatient, can obtain CT urogram with Cr is acceptable for contrast.  -Castro in place - do not remove without speaking to urology - placed with cystoscopy.   -Urine cx negative     D/w Dr. Jama Gong [facility]  [address] [place] [phone]

## 2024-05-13 NOTE — DISCHARGE NOTE PROVIDER - CARE PROVIDERS DIRECT ADDRESSES
,DirectAddress_Unknown ,DirectAddress_Unknown,[email or web address]

## 2024-05-13 NOTE — DISCHARGE NOTE PROVIDER - NSDCFUADDAPPT_GEN_ALL_CORE_FT
Please schedule an appointment with IR in 3 months for a routine exchange of your right nephrostomy tube or sooner if instructed by your urologist/ nephrologist. You may call the IR booking office @ [phone] to schedule. Please feel free to contact us at [phone] if any problems arise. After 6PM, Monday through Friday, on weekends and on holidays, please call [phone] and ask for the radiology resident on call to be paged.  Please schedule an appointment with IR in 3 months for a routine exchange of your right nephrostomy tube or sooner if instructed by your urologist/ nephrologist. You may call the IR booking office @ [phone] to schedule. Please feel free to contact us at [phone] if any problems arise. After 6PM, Monday through Friday, on weekends and on holidays, please call [phone] and ask for the radiology resident on call to be paged.     APPTS ARE READY TO BE MADE: [ x] YES    Best Family or Patient Contact (if needed):  Sister if patient not available.     Additional Information about above appointments (if needed):    1: Dr. Yun urology - 1 week  2: IR booking office at [phone] - for lymph node biopsy  3: Dr. Trujillo for oncology follow up - 2 weeks    Other comments or requests:

## 2024-05-13 NOTE — DISCHARGE NOTE PROVIDER - HOSPITAL COURSE
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     Patient reports persistent right upper quadrant pain over the past four days,  radiating to the right flank area, associated with nausea but no vomiting , no exacerbating or reliving factors, he also reports bloating and constipation during this time. He reports no fever or chills. 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         Hospital Course:  Patient presents with persistent right upper quadrant pain over the past four days,  radiating to the right flank area, associated with nausea but no vomiting , no exacerbating or reliving factors, he also reports bloating and constipation during this time. He reports no fever or chills. Of note , patient had about 10lb unintended weight loss over the past several months. He is found to have soft tissue filling defected in right ureter, possible neoplasm, as well as significant RP lymphadenopathy c/f lymphoma.     Retroperitoneal lymphadenopathy.    Significant retroperitoneal lymphadenopathy, which is concerning for a lymphoproliferative process such as lymphoma.   - Oncology consult appreciated; follows with Dr. Trujillo at [facility]. They recommend ureteral mass biopsy by urology and not of the lymphoma for now and no CT chest.   -Urology recs CT urogram with contrast for staging once Cr improves/stabilizes further. -If can't get CT urogram, will get MRI urogram.   -MRI urogram: shows A circumferential enhancement and mural thickening of the right mid ureter measuring 1.0 cm, suggestive of ureteral lesion.  -F/u urology recs for ureteral lesion.- uric acid 10.1, given rasburicase 3 mg IV x1 on 5/11 with improvement of uric acid to 2.7  - IR consult order for LN biopsy in place; [facility] do not recommend LN biopsy or CT chest at this time. Pt to follow up outpatient      Acute kidney injury superimposed on CKD, suspect 2/2 obstructive renal. Creatinine trend: 5.00 -> 5.21 -> 5.88 -> 4.33 -> 3.34 -> 2.39 -> 2.06.   - Hydronephrosis, right. - s/p attempted manuel insertion on 5/10, failed- manuel now placed by urology on 5/11, will maintain. -Do not remove without discussion with urology team.   - IR placed right PCN on 5/12.   - antibiotic prophylaxis due to  instrumentation, plan for 3 days of antibiotics (s/p ciprofloxacin on 5/10, c/w aztreonam 5/11 - 5/12).    Dishcharged home to follow up with Urology, IR and Dr. Trujillo.   Important Medication Changes and Reason:    Active or Pending Issues Requiring Follow-up:  ** Urology, IR and Ronnie. Will need LN biopsy, Urology follow up for Ureteral mass.     Advanced Directives:   [ x] Full code  [ ] DNR  [ ] Hospice    Discharge Diagnoses:  Uretal mass  R hydronephrosis  LIA  Lymphoma  Urinary retention with manuel on discahrge  Hyperkalema

## 2024-05-13 NOTE — PROGRESS NOTE ADULT - PROBLEM SELECTOR PLAN 1
suspect 2/2 obstructive renal  - creatinine trend: 5.00 -> 5.21 -> 5.88 -> 4.33.   - secondary to obstructive process, -S/p right PCN on 5/12.   - renal consult appreciated  - renal dose medications  - monitor ins and outs  - monitor creatinine   - avoid nephrotoxins

## 2024-05-13 NOTE — PROGRESS NOTE ADULT - PROBLEM SELECTOR PLAN 1
Pt. with LIA on CKD in the setting of obstructive uropathy from retroperitoneal masses (has known hx of lymphoma). No prior labs available for review on [facility]. As per pt. his Scr normally ranges between 1.7 to 2. Scr on admission was elevated at 5.06 (5/10) and peaked at 5.88 on 5/12. Pt. underwent PCN on 5/12 and Scr has improved to 4.33 today. UA with 300mg of protein, trace ketones, large blood, and 18 RBCs. UOP 1.3L and 0.5L from PCN. CT abd/pelv on admision showed soft tissue filling defect in the right mid/distal ureter, measuring 1.5 cm, possibly neoplasm, resultant moderate right hydroureteronephrosis, and significant retroperitoneal lymphadenopathy, which is concerning for a lymphoproliferative process such as lymphoma. Monitor labs and urine output. Avoid nephrotoxins. Dose medications as per eGFR.    If you have any questions, please feel free to contact me.  Fish Austin MD  Nephrology Fellow  [email or web address] / [employer] Teams (Preferred)  (After 4pm or on weekends, please call the on-call Fellow)

## 2024-05-13 NOTE — CONSULT NOTE ADULT - SUBJECTIVE AND OBJECTIVE BOX
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 Patient reports persistent right upper quadrant pain over the past four days,  radiating to the right flank area, associated with nausea but no vomiting , no exacerbating or reliving factors, he also reports bloating and constipation during this time. He reports no fever or chills. Of note , patient had about 10lb unintended weight loss over the past several months    Heme/onc consulted on this 89­year­old male with multiple squamous cell and basal cell carcinomas of the skin in the past requiring resection. He has no history of prior radiation or systemic therapy. 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 Follows with Dr. Trujillo at [facility].     PAST MEDICAL & SURGICAL HISTORY:  Hypertension      HLD (hyperlipidemia)      History of prostate surgery          FAMILY HISTORY:      Alochol: Denied  Smoking: Nonsmoker  Drug Use: Denied  Marital Status:         Allergies    penicillin (Rash)    Intolerances        MEDICATIONS  (STANDING):  amLODIPine   Tablet 5 milliGRAM(s) Oral daily  atorvastatin 20 milliGRAM(s) Oral at bedtime  metoprolol succinate ER 50 milliGRAM(s) Oral daily  polyethylene glycol 3350 17 Gram(s) Oral <User Schedule>  senna 2 Tablet(s) Oral at bedtime  tamsulosin 0.4 milliGRAM(s) Oral at bedtime    MEDICATIONS  (PRN):  acetaminophen     Tablet .. 650 milliGRAM(s) Oral every 6 hours PRN Temp greater or equal to 38C (100.4F), Mild Pain (1 - 3)  melatonin 3 milliGRAM(s) Oral at bedtime PRN Insomnia  ondansetron Injectable 4 milliGRAM(s) IV Push every 8 hours PRN Nausea and/or Vomiting      ROS  No fever, sweats, chills  No epistaxis, HA, sore throat  No CP, SOB, cough, sputum  + abd pain  No edema  No rash  No anxiety  No back pain, joint pain  No bleeding, bruising  No dysuria, hematuria    T(C): 36.4 (05-13-24 @ 11:05), Max: 37 (05-12-24 @ 16:20)  HR: 90 (05-13-24 @ 11:05) (70 - 90)  BP: 125/68 (05-13-24 @ 11:05) (125/68 - 165/79)  RR: 18 (05-13-24 @ 11:05) (14 - 19)  SpO2: 94% (05-13-24 @ 11:05) (94% - 96%)  Wt(kg): --    PE  NAD  Awake, alert  Anicteric, MMM  RRR  CTAB  right PCN  No c/c/e  No rash grossly  FROM                          15.4   7.02  )-----------( 205      ( 13 May 2024 07:01 )             46.6       05-13    136  |  104  |  88<H>  ----------------------------<  82  5.0   |  18<L>  |  4.33<H>    Ca    9.0      13 May 2024 07:01

## 2024-05-13 NOTE — DISCHARGE NOTE PROVIDER - CARE PROVIDER_API CALL
Salty Martin  Interventional Radiology and Diagnostic Radiology  [address] [place] [address]  Phone: [phone]  Fax: [phone]  Follow Up Time: Routine   Salty Martin  Interventional Radiology and Diagnostic Radiology  [address] [place] [address]  Phone: [phone]  Fax: [phone]  Follow Up Time: Routine    [place], [place] Hermes  Hematology  [address]  [place] [address]  Phone: [phone]  Fax: [phone]  Follow Up Time:     Kevin Yun  Urology  [address] [facility], [address]  [place] [address]  Phone: [phone]  Fax: [phone]  Follow Up Time:     Guille Riojas  Nephrology  [address] [facility]  [place] [address]  Phone: [phone]  Fax: [phone]  Follow Up Time:

## 2024-05-13 NOTE — CONSULT NOTE ADULT - ASSESSMENT
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    Low­grade B­cell lymphoproliferative neoplasm   - Follows with Dr. Trujillo at [facility]. On surveillance  - Last flow cytometry on 3/21/2024 was normal  - PET 09/2023: Again is noted hypermetabolic nodes above and below the diaphragm in the same distribution with interval variable hypermetabolic changes consistent with active lymphomatous disease. PET score according to  Lugano classification is 5. Interval metabolic resolution of cutaneous activity in the left scalp at the vertex. No distinct FDG avid cutaneous lesion given some skin lesions may be below PET resolution. Continued surveillance dermatologic exam is recommended.  - CT AP: Soft tissue filling defect in the right mid/distal ureter, measuring 1.5 cm, possibly neoplasm. Resultant moderate right hydroureteronephrosis. Significant retroperitoneal lymphadenopathy, which is concerning for a lymphoproliferative process such as lymphoma.  - Received rasburicase 5/11, uric acid now 2.7  - Recommend urology work up including biopsy of ureteral mass  - Known lymphadenopathy looks relatively stable    Multiple squamous cell and basal cell carcinomas of the skin s/p resections  - Follow up with outpatient dermatologist.    Will continue to follow.    Kenroy Che PA-C  Hematology/Oncology  [facility]  [phone] (office)

## 2024-05-13 NOTE — PROGRESS NOTE ADULT - PROBLEM SELECTOR PLAN 7
- DVT ppx: hold, in preparation for IR guided procedure, resume 5/14 tentatively if Hgb stable.   - GI ppx: none  - Diet: as tolerated. renal diet for now.   - IVF: prn    8. D/w VICTOR M Palomares.

## 2024-05-13 NOTE — PROGRESS NOTE ADULT - PROBLEM SELECTOR PLAN 4
Significant retroperitoneal lymphadenopathy, which is concerning for a lymphoproliferative process such as lymphoma.   - Oncology consult appreciated; follows with Dr. Trujillo at [facility]. They recommend ureteral mass biopsy by urology and not of the lymphoma for now and no CT chest.   -Urology recs CT urogram with contrast for staging once Cr improves/stabilizes further.   - uric acid 10.1, given rasburicase 3 mg IV x1 on 5/11 with improvement of uric acid to 2.7  - trend LDH/ Uric Acid   - Ddimer/ fibrinogen  - IR consult order for LN biopsy in place; [facility] do not recommend LN biopsy or CT chest at this time.

## 2024-05-13 NOTE — DISCHARGE NOTE PROVIDER - NSDCCPCAREPLAN_GEN_ALL_CORE_FT
PRINCIPAL DISCHARGE DIAGNOSIS  Diagnosis: Abdominal pain  Assessment and Plan of Treatment:       SECONDARY DISCHARGE DIAGNOSES  Diagnosis: Retroperitoneal lymphadenopathy  Assessment and Plan of Treatment:     Diagnosis: Acute kidney injury superimposed on CKD  Assessment and Plan of Treatment:     Diagnosis: Hydronephrosis, right  Assessment and Plan of Treatment:     Diagnosis: Ureteral mass  Assessment and Plan of Treatment:     Diagnosis: Lymphoma  Assessment and Plan of Treatment:     Diagnosis: Hyperkalemia  Assessment and Plan of Treatment:      PRINCIPAL DISCHARGE DIAGNOSIS  Diagnosis: Ureteral mass  Assessment and Plan of Treatment: R ureteral mass  YOU WILL NEED *  outpatient ureteroscopy for biopsy of ureteral tumor.      SECONDARY DISCHARGE DIAGNOSES  Diagnosis: Retroperitoneal lymphadenopathy  Assessment and Plan of Treatment: Follow  up with Dr. Trujillo to schedule outpatient biopsy   lymphadenopathy Seen in CT ** YOU WILL NEED OUTPATIENT LN BIOPSY      Diagnosis: Hydronephrosis, right  Assessment and Plan of Treatment: NPhrostomy tube placed by IR. Follow up wiht IR for managment. See additional instructions for managment at home    Diagnosis: Acute kidney injury superimposed on CKD  Assessment and Plan of Treatment: Avoid taking (NSAIDs) - (ex: Ibuprofen, Advil, Celebrex, Naprosyn)  Avoid taking any nephrotoxic agents (can harm kidneys) - Intravenous contrast for diagnostic testing, combination cold medications.  Have all medications adjusted for your renal function by your Health Care Provider.  Blood pressure control is important.  Take all medication as prescribed.      Diagnosis: Lymphoma  Assessment and Plan of Treatment: Follow up with Dr. Trujillo for outpatient managment    Diagnosis: Hyperkalemia  Assessment and Plan of Treatment: Resolved    Diagnosis: Urinary retention  Assessment and Plan of Treatment: Castro placed 5/12/24. FOllow up with urology for managment and home care services

## 2024-05-13 NOTE — CONSULT NOTE ADULT - NS ATTEND AMEND GEN_ALL_CORE FT
As above    88 y/o male with known low-grade B-cell lymphoproliferative disorder admitted with acute kidney injury with concern for ureteral mass. This was not apparent on prior imaging. His lymphadenopathy appears to be stable otherwise. Recommend urology follow-up to evaluate and biopsy of ureteral mass to determine specific etiology. Continue to monitor creatinine levels closely.

## 2024-05-14 LAB
ANION GAP SERPL CALC-SCNC: 16 MMOL/L — SIGNIFICANT CHANGE UP (ref 5–17)
BASOPHILS # BLD AUTO: 0.05 K/UL — SIGNIFICANT CHANGE UP (ref 0–0.2)
BASOPHILS NFR BLD AUTO: 0.6 % — SIGNIFICANT CHANGE UP (ref 0–2)
BUN SERPL-MCNC: 86 MG/DL — HIGH (ref 7–23)
CALCIUM SERPL-MCNC: 8.9 MG/DL — SIGNIFICANT CHANGE UP (ref 8.4–10.5)
CHLORIDE SERPL-SCNC: 103 MMOL/L — SIGNIFICANT CHANGE UP (ref 96–108)
CO2 SERPL-SCNC: 18 MMOL/L — LOW (ref 22–31)
CREAT SERPL-MCNC: 3.34 MG/DL — HIGH (ref 0.5–1.3)
CULTURE RESULTS: NO GROWTH — SIGNIFICANT CHANGE UP
EGFR: 17 ML/MIN/1.73M2 — LOW
EOSINOPHIL # BLD AUTO: 0.22 K/UL — SIGNIFICANT CHANGE UP (ref 0–0.5)
EOSINOPHIL NFR BLD AUTO: 2.5 % — SIGNIFICANT CHANGE UP (ref 0–6)
GLUCOSE SERPL-MCNC: 123 MG/DL — HIGH (ref 70–99)
HCT VFR BLD CALC: 46.9 % — SIGNIFICANT CHANGE UP (ref 39–50)
HGB BLD-MCNC: 15.6 G/DL — SIGNIFICANT CHANGE UP (ref 13–17)
IMM GRANULOCYTES NFR BLD AUTO: 0.5 % — SIGNIFICANT CHANGE UP (ref 0–0.9)
LYMPHOCYTES # BLD AUTO: 0.52 K/UL — LOW (ref 1–3.3)
LYMPHOCYTES # BLD AUTO: 5.9 % — LOW (ref 13–44)
MCHC RBC-ENTMCNC: 28.8 PG — SIGNIFICANT CHANGE UP (ref 27–34)
MCHC RBC-ENTMCNC: 33.3 GM/DL — SIGNIFICANT CHANGE UP (ref 32–36)
MCV RBC AUTO: 86.7 FL — SIGNIFICANT CHANGE UP (ref 80–100)
MONOCYTES # BLD AUTO: 0.89 K/UL — SIGNIFICANT CHANGE UP (ref 0–0.9)
MONOCYTES NFR BLD AUTO: 10.1 % — SIGNIFICANT CHANGE UP (ref 2–14)
NEUTROPHILS # BLD AUTO: 7.13 K/UL — SIGNIFICANT CHANGE UP (ref 1.8–7.4)
NEUTROPHILS NFR BLD AUTO: 80.4 % — HIGH (ref 43–77)
NRBC # BLD: 0 /100 WBCS — SIGNIFICANT CHANGE UP (ref 0–0)
PHOSPHATE SERPL-MCNC: 3.5 MG/DL — SIGNIFICANT CHANGE UP (ref 2.5–4.5)
PLATELET # BLD AUTO: 197 K/UL — SIGNIFICANT CHANGE UP (ref 150–400)
POTASSIUM SERPL-MCNC: 4.3 MMOL/L — SIGNIFICANT CHANGE UP (ref 3.5–5.3)
POTASSIUM SERPL-SCNC: 4.3 MMOL/L — SIGNIFICANT CHANGE UP (ref 3.5–5.3)
RBC # BLD: 5.41 M/UL — SIGNIFICANT CHANGE UP (ref 4.2–5.8)
RBC # FLD: 13.3 % — SIGNIFICANT CHANGE UP (ref 10.3–14.5)
SODIUM SERPL-SCNC: 137 MMOL/L — SIGNIFICANT CHANGE UP (ref 135–145)
SPECIMEN SOURCE: SIGNIFICANT CHANGE UP
WBC # BLD: 8.85 K/UL — SIGNIFICANT CHANGE UP (ref 3.8–10.5)
WBC # FLD AUTO: 8.85 K/UL — SIGNIFICANT CHANGE UP (ref 3.8–10.5)

## 2024-05-14 PROCEDURE — 99232 SBSQ HOSP IP/OBS MODERATE 35: CPT

## 2024-05-14 RX ORDER — SODIUM CHLORIDE 9 MG/ML
1000 INJECTION, SOLUTION INTRAVENOUS
Refills: 0 | Status: DISCONTINUED | OUTPATIENT
Start: 2024-05-14 | End: 2024-05-18

## 2024-05-14 RX ORDER — HEPARIN SODIUM 5000 [USP'U]/ML
5000 INJECTION INTRAVENOUS; SUBCUTANEOUS EVERY 12 HOURS
Refills: 0 | Status: DISCONTINUED | OUTPATIENT
Start: 2024-05-14 | End: 2024-05-18

## 2024-05-14 RX ADMIN — Medication 50 MILLIGRAM(S): at 05:27

## 2024-05-14 RX ADMIN — SODIUM CHLORIDE 75 MILLILITER(S): 9 INJECTION, SOLUTION INTRAVENOUS at 16:09

## 2024-05-14 RX ADMIN — SENNA PLUS 2 TABLET(S): 8.6 TABLET ORAL at 21:28

## 2024-05-14 RX ADMIN — TAMSULOSIN HYDROCHLORIDE 0.4 MILLIGRAM(S): 0.4 CAPSULE ORAL at 21:28

## 2024-05-14 RX ADMIN — AMLODIPINE BESYLATE 5 MILLIGRAM(S): 2.5 TABLET ORAL at 05:27

## 2024-05-14 RX ADMIN — POLYETHYLENE GLYCOL 3350 17 GRAM(S): 17 POWDER, FOR SOLUTION ORAL at 17:38

## 2024-05-14 RX ADMIN — ATORVASTATIN CALCIUM 20 MILLIGRAM(S): 80 TABLET, FILM COATED ORAL at 21:28

## 2024-05-14 RX ADMIN — HEPARIN SODIUM 5000 UNIT(S): 5000 INJECTION INTRAVENOUS; SUBCUTANEOUS at 17:38

## 2024-05-14 NOTE — PROGRESS NOTE ADULT - PROBLEM SELECTOR PLAN 4
Significant retroperitoneal lymphadenopathy, which is concerning for a lymphoproliferative process such as lymphoma.   - Oncology consult appreciated; follows with Dr. Trujillo at [facility]. They recommend ureteral mass biopsy by urology and not of the lymphoma for now and no CT chest.   -Urology recs CT urogram with contrast for staging once Cr improves/stabilizes further. -If can't get CT urogram, will get MRI urogram.   - uric acid 10.1, given rasburicase 3 mg IV x1 on 5/11 with improvement of uric acid to 2.7  - trend LDH/ Uric Acid   - Ddimer/ fibrinogen  - IR consult order for LN biopsy in place; [facility] do not recommend LN biopsy or CT chest at this time.

## 2024-05-14 NOTE — PHYSICAL THERAPY INITIAL EVALUATION ADULT - GENERAL OBSERVATIONS, REHAB EVAL
Pt received semi-supine in bed in NAD, +IV Lock, +Castro, +Nephrostomy tube. Pt AOx4, pleasant and cooperative. Pt received seated in chair, in NAD, +IV Lock, +Castro, +Nephrostomy tube. Pt AOx4, pleasant and cooperative.

## 2024-05-14 NOTE — PHYSICAL THERAPY INITIAL EVALUATION ADULT - NSPTDMEREC_GEN_A_CORE
Pt will require rolling walker at home due to their dx of unsteady gait to help complete MRADL's ./rolling walker

## 2024-05-14 NOTE — PROGRESS NOTE ADULT - PROBLEM SELECTOR PLAN 1
suspect 2/2 obstructive renal  - creatinine trend: 5.00 -> 5.21 -> 5.88 -> 4.33 -> 3.34.   - secondary to obstructive process, -S/p right PCN on 5/12.   - renal consult appreciated  - renal dose medications  - monitor ins and outs  - monitor creatinine   - avoid nephrotoxins  -Will give some IV LR at 75cc/hr x 12 hours.

## 2024-05-14 NOTE — PROGRESS NOTE ADULT - PROBLEM SELECTOR PLAN 7
- DVT ppx: will resume pending procedures. HSQ BID.   - GI ppx: none  - Diet: as tolerated. renal diet for now.   - IVF: prn    8. D/w VICTOR M Silveira.

## 2024-05-14 NOTE — PHYSICAL THERAPY INITIAL EVALUATION ADULT - ADDITIONAL COMMENTS
Pt lives with sister in pvt house with 5 steps to enter with rail. Pt lives on first floor. Prior to admission pt was independent in all functional mobility without AD. Pt has had no falls.

## 2024-05-14 NOTE — PHYSICAL THERAPY INITIAL EVALUATION ADULT - PERTINENT HX OF CURRENT PROBLEM, REHAB EVAL
89y/o M with PMH of HTN, HLD, CKD, BPH s/p TURP 16 years ago at [facility] (doesn't recall urologist), and SCC presents to ED complaining of R flank abdominal pain for the past 4 days but worsening since yesterday.  CT scan with findings of a soft tissue filling defect in the right mid/distal ureter, measuring 1.5 cm, possibly neoplasm, moderate right hydroureteronephrosis, and significant retroperitoneal lymphadenopathy, which is concerning for a lymphoproliferative process such as lymphoma.  Hyperkalemia now corrected with lokelma. s/p R NT 5/12. Now with downtrending Cr.  Plan:-Cr improved to 4.22

## 2024-05-15 LAB
ANION GAP SERPL CALC-SCNC: 12 MMOL/L — SIGNIFICANT CHANGE UP (ref 5–17)
BUN SERPL-MCNC: 68 MG/DL — HIGH (ref 7–23)
CALCIUM SERPL-MCNC: 8.9 MG/DL — SIGNIFICANT CHANGE UP (ref 8.4–10.5)
CHLORIDE SERPL-SCNC: 107 MMOL/L — SIGNIFICANT CHANGE UP (ref 96–108)
CO2 SERPL-SCNC: 21 MMOL/L — LOW (ref 22–31)
CREAT SERPL-MCNC: 2.39 MG/DL — HIGH (ref 0.5–1.3)
EGFR: 26 ML/MIN/1.73M2 — LOW
GLUCOSE BLDC GLUCOMTR-MCNC: 118 MG/DL — HIGH (ref 70–99)
GLUCOSE SERPL-MCNC: 110 MG/DL — HIGH (ref 70–99)
LDH SERPL L TO P-CCNC: 173 U/L — SIGNIFICANT CHANGE UP (ref 50–242)
POTASSIUM SERPL-MCNC: 4.1 MMOL/L — SIGNIFICANT CHANGE UP (ref 3.5–5.3)
POTASSIUM SERPL-SCNC: 4.1 MMOL/L — SIGNIFICANT CHANGE UP (ref 3.5–5.3)
SODIUM SERPL-SCNC: 140 MMOL/L — SIGNIFICANT CHANGE UP (ref 135–145)
URATE SERPL-MCNC: 5.8 MG/DL — SIGNIFICANT CHANGE UP (ref 3.4–8.8)

## 2024-05-15 PROCEDURE — 99232 SBSQ HOSP IP/OBS MODERATE 35: CPT

## 2024-05-15 RX ADMIN — Medication 650 MILLIGRAM(S): at 23:52

## 2024-05-15 RX ADMIN — AMLODIPINE BESYLATE 5 MILLIGRAM(S): 2.5 TABLET ORAL at 06:09

## 2024-05-15 RX ADMIN — HEPARIN SODIUM 5000 UNIT(S): 5000 INJECTION INTRAVENOUS; SUBCUTANEOUS at 06:09

## 2024-05-15 RX ADMIN — Medication 50 MILLIGRAM(S): at 06:09

## 2024-05-15 RX ADMIN — SENNA PLUS 2 TABLET(S): 8.6 TABLET ORAL at 21:53

## 2024-05-15 RX ADMIN — ATORVASTATIN CALCIUM 20 MILLIGRAM(S): 80 TABLET, FILM COATED ORAL at 21:53

## 2024-05-15 RX ADMIN — HEPARIN SODIUM 5000 UNIT(S): 5000 INJECTION INTRAVENOUS; SUBCUTANEOUS at 17:09

## 2024-05-15 RX ADMIN — TAMSULOSIN HYDROCHLORIDE 0.4 MILLIGRAM(S): 0.4 CAPSULE ORAL at 21:53

## 2024-05-15 NOTE — PROGRESS NOTE ADULT - PROBLEM SELECTOR PLAN 7
- DVT ppx: will resume pending procedures. HSQ BID.   - GI ppx: none  - Diet: as tolerated. renal diet for now.   - IVF: prn    8. D/w VICTOR M Silveira. - DVT ppx: will resume pending procedures. HSQ BID.   - GI ppx: none  - Diet: as tolerated. renal diet for now.   - IVF: prn    8. D/w VICTOR M Silveira.  -Updated sister Jeanette over the phone on 5/15. Patient was ok with updating her.

## 2024-05-15 NOTE — PROGRESS NOTE ADULT - PROBLEM SELECTOR PLAN 4
Significant retroperitoneal lymphadenopathy, which is concerning for a lymphoproliferative process such as lymphoma.   - Oncology consult appreciated; follows with Dr. Trujillo at [facility]. They recommend ureteral mass biopsy by urology and not of the lymphoma for now and no CT chest.   -Urology recs CT urogram with contrast for staging once Cr improves/stabilizes further. -If can't get CT urogram, will get MRI urogram. -will get urogram to avoid renal injury.   - uric acid 10.1, given rasburicase 3 mg IV x1 on 5/11 with improvement of uric acid to 2.7  - trend LDH/ Uric Acid   - Ddimer/ fibrinogen  - IR consult order for LN biopsy in place; [facility] do not recommend LN biopsy or CT chest at this time.

## 2024-05-15 NOTE — PROGRESS NOTE ADULT - PROBLEM SELECTOR PLAN 1
suspect 2/2 obstructive renal  - creatinine trend: 5.00 -> 5.21 -> 5.88 -> 4.33 -> 3.34 -> 2.39.   - secondary to obstructive process, -S/p right PCN on 5/12.   - renal consult appreciated  - renal dose medications  - monitor ins and outs  - monitor creatinine   - avoid nephrotoxins  -s/p IV LR at 75cc/hr x 12 hours. encouraged po fluid intake increase.

## 2024-05-16 LAB
ANION GAP SERPL CALC-SCNC: 15 MMOL/L — SIGNIFICANT CHANGE UP (ref 5–17)
BUN SERPL-MCNC: 60 MG/DL — HIGH (ref 7–23)
CALCIUM SERPL-MCNC: 8.6 MG/DL — SIGNIFICANT CHANGE UP (ref 8.4–10.5)
CHLORIDE SERPL-SCNC: 104 MMOL/L — SIGNIFICANT CHANGE UP (ref 96–108)
CO2 SERPL-SCNC: 19 MMOL/L — LOW (ref 22–31)
CREAT SERPL-MCNC: 2.06 MG/DL — HIGH (ref 0.5–1.3)
EGFR: 31 ML/MIN/1.73M2 — LOW
GLUCOSE SERPL-MCNC: 91 MG/DL — SIGNIFICANT CHANGE UP (ref 70–99)
POTASSIUM SERPL-MCNC: 4.2 MMOL/L — SIGNIFICANT CHANGE UP (ref 3.5–5.3)
POTASSIUM SERPL-SCNC: 4.2 MMOL/L — SIGNIFICANT CHANGE UP (ref 3.5–5.3)
SODIUM SERPL-SCNC: 138 MMOL/L — SIGNIFICANT CHANGE UP (ref 135–145)

## 2024-05-16 PROCEDURE — 99233 SBSQ HOSP IP/OBS HIGH 50: CPT

## 2024-05-16 PROCEDURE — 74183 MRI ABD W/O CNTR FLWD CNTR: CPT | Mod: 26

## 2024-05-16 PROCEDURE — 99232 SBSQ HOSP IP/OBS MODERATE 35: CPT | Mod: GC

## 2024-05-16 PROCEDURE — 72197 MRI PELVIS W/O & W/DYE: CPT | Mod: 26

## 2024-05-16 RX ADMIN — ATORVASTATIN CALCIUM 20 MILLIGRAM(S): 80 TABLET, FILM COATED ORAL at 22:20

## 2024-05-16 RX ADMIN — Medication 3 MILLIGRAM(S): at 22:20

## 2024-05-16 RX ADMIN — Medication 50 MILLIGRAM(S): at 06:23

## 2024-05-16 RX ADMIN — Medication 650 MILLIGRAM(S): at 01:32

## 2024-05-16 RX ADMIN — Medication 30 MILLILITER(S): at 13:55

## 2024-05-16 RX ADMIN — TAMSULOSIN HYDROCHLORIDE 0.4 MILLIGRAM(S): 0.4 CAPSULE ORAL at 22:20

## 2024-05-16 RX ADMIN — AMLODIPINE BESYLATE 5 MILLIGRAM(S): 2.5 TABLET ORAL at 06:23

## 2024-05-16 RX ADMIN — HEPARIN SODIUM 5000 UNIT(S): 5000 INJECTION INTRAVENOUS; SUBCUTANEOUS at 06:23

## 2024-05-16 RX ADMIN — HEPARIN SODIUM 5000 UNIT(S): 5000 INJECTION INTRAVENOUS; SUBCUTANEOUS at 17:11

## 2024-05-16 NOTE — PROGRESS NOTE ADULT - PROBLEM SELECTOR PLAN 4
Significant retroperitoneal lymphadenopathy, which is concerning for a lymphoproliferative process such as lymphoma.   - Oncology consult appreciated; follows with Dr. Trujillo at [facility]. They recommend ureteral mass biopsy by urology and not of the lymphoma for now and no CT chest.   -Urology recs CT urogram with contrast for staging once Cr improves/stabilizes further. -If can't get CT urogram, will get MRI urogram.   -MRI urogram: shows A circumferential enhancement and mural thickening of the right mid ureter measuring 1.0 cm, suggestive of ureteral lesion.  -F/u urology recs for ureteral lesion.  - uric acid 10.1, given rasburicase 3 mg IV x1 on 5/11 with improvement of uric acid to 2.7  - trend LDH/ Uric Acid   - Ddimer/ fibrinogen  - IR consult order for LN biopsy in place; [facility] do not recommend LN biopsy or CT chest at this time.

## 2024-05-16 NOTE — PROGRESS NOTE ADULT - PROBLEM SELECTOR PLAN 7
- DVT ppx:  HSQ BID.   - GI ppx: none  - Diet: Renal restrictions lifted given renal recovery. Soft and bite sized per patient preference.   - IVF: prn    8. D/w VICTOR M Silveira.  -Updated sister Jeanette over the phone on 5/15.

## 2024-05-16 NOTE — PROGRESS NOTE ADULT - PROBLEM SELECTOR PLAN 1
suspect 2/2 obstructive renal  - creatinine trend: 5.00 -> 5.21 -> 5.88 -> 4.33 -> 3.34 -> 2.39 -> 2.06.   - secondary to obstructive process, -S/p right PCN on 5/12.   - renal consult appreciated  - renal dose medications  - monitor ins and outs  - monitor creatinine   - avoid nephrotoxins  -s/p IV LR at 75cc/hr x 12 hours. encouraged po fluid intake increase.

## 2024-05-17 LAB
ANION GAP SERPL CALC-SCNC: 15 MMOL/L — SIGNIFICANT CHANGE UP (ref 5–17)
BUN SERPL-MCNC: 55 MG/DL — HIGH (ref 7–23)
CALCIUM SERPL-MCNC: 8.8 MG/DL — SIGNIFICANT CHANGE UP (ref 8.4–10.5)
CHLORIDE SERPL-SCNC: 105 MMOL/L — SIGNIFICANT CHANGE UP (ref 96–108)
CO2 SERPL-SCNC: 19 MMOL/L — LOW (ref 22–31)
CREAT SERPL-MCNC: 2.02 MG/DL — HIGH (ref 0.5–1.3)
EGFR: 31 ML/MIN/1.73M2 — LOW
GLUCOSE SERPL-MCNC: 102 MG/DL — HIGH (ref 70–99)
LDH SERPL L TO P-CCNC: 204 U/L — SIGNIFICANT CHANGE UP (ref 50–242)
POTASSIUM SERPL-MCNC: 4.1 MMOL/L — SIGNIFICANT CHANGE UP (ref 3.5–5.3)
POTASSIUM SERPL-SCNC: 4.1 MMOL/L — SIGNIFICANT CHANGE UP (ref 3.5–5.3)
SODIUM SERPL-SCNC: 139 MMOL/L — SIGNIFICANT CHANGE UP (ref 135–145)
URATE SERPL-MCNC: 7 MG/DL — SIGNIFICANT CHANGE UP (ref 3.4–8.8)

## 2024-05-17 PROCEDURE — 99233 SBSQ HOSP IP/OBS HIGH 50: CPT

## 2024-05-17 PROCEDURE — 71250 CT THORAX DX C-: CPT | Mod: 26

## 2024-05-17 PROCEDURE — 99232 SBSQ HOSP IP/OBS MODERATE 35: CPT | Mod: GC

## 2024-05-17 RX ORDER — SENNA PLUS 8.6 MG/1
2 TABLET ORAL
Qty: 0 | Refills: 0 | DISCHARGE
Start: 2024-05-17

## 2024-05-17 RX ORDER — POLYETHYLENE GLYCOL 3350 17 G/17G
17 POWDER, FOR SOLUTION ORAL
Qty: 0 | Refills: 0 | DISCHARGE
Start: 2024-05-17

## 2024-05-17 RX ADMIN — HEPARIN SODIUM 5000 UNIT(S): 5000 INJECTION INTRAVENOUS; SUBCUTANEOUS at 05:25

## 2024-05-17 RX ADMIN — TAMSULOSIN HYDROCHLORIDE 0.4 MILLIGRAM(S): 0.4 CAPSULE ORAL at 22:30

## 2024-05-17 RX ADMIN — Medication 50 MILLIGRAM(S): at 05:26

## 2024-05-17 RX ADMIN — HEPARIN SODIUM 5000 UNIT(S): 5000 INJECTION INTRAVENOUS; SUBCUTANEOUS at 18:08

## 2024-05-17 RX ADMIN — AMLODIPINE BESYLATE 5 MILLIGRAM(S): 2.5 TABLET ORAL at 05:26

## 2024-05-17 RX ADMIN — ATORVASTATIN CALCIUM 20 MILLIGRAM(S): 80 TABLET, FILM COATED ORAL at 22:30

## 2024-05-17 NOTE — PROGRESS NOTE ADULT - ATTENDING COMMENTS
Team spoke to niece today - h/o lymphoma in recent past but never treated.  Plan for IR NT today - will need ureteroscopy and biopsy of mass at some stage depending on f/u imaging with contrast once renal function
LIA on CKD:  in the setting of obstructive uropathy from retroperitoneal masses (has known hx of lymphoma). No prior labs available for review on [place]/[facility]. As per pt. his Scr normally ranges between 1.7 to 2. Scr on admission was elevated at 5.06 (5/10) and peaked at 5.88 on 5/12. Pt. underwent PCN on 5/12    Kidney function continues to improve, Close to baseline now  Non oliguric. Maintain manuel  Monitor    Rest as per Dr. Alexis Riojas MD  O: [phone]  Contact me on teams
Pt. with LIA on CKD in the setting of obstructive uropathy from retroperitoneal masses (has known hx of lymphoma). No prior labs available for review on [place]/[facility]. As per pt. his Scr normally ranges between 1.7 to 2. Scr on admission was elevated at 5.06 (5/10) and peaked at 5.88 on 5/12. Pt. underwent PCN on 5/12 and Scr has improved to 4.33 today. Alert, conversant, draining PCN  1.  ARF on CKD--prominent 2 component.  No uremic symptoms, no HD at present  2.  Obstructive uropathy--PCN, trend UO, Cr  3.  Hyperkalemia--improved.  Rx 4, lokelma >5.5  4.  Acidosis--2 related.  HCO3 supplement >21 will help 3.    discussed with med team  Harrison Dobson MD  contact me on TEAMS
LIA on CKD:  in the setting of obstructive uropathy from retroperitoneal masses (has known hx of lymphoma). No prior labs available for review on [place]/[facility]. As per pt. his Scr normally ranges between 1.7 to 2. Scr on admission was elevated at 5.06 (5/10) and peaked at 5.88 on 5/12. Pt. underwent PCN on 5/12    Kidney function stable and at baseline  Non oliguric with close to 1.9 L of urine. PCN bag draining clear urine  Maintain manuel       Rest as per Dr. Alexis Riojas MD  O: [phone]  Contact me on teams

## 2024-05-17 NOTE — PROGRESS NOTE ADULT - PROBLEM SELECTOR PLAN 4
Significant retroperitoneal lymphadenopathy, which is concerning for a lymphoproliferative process such as lymphoma.   - Oncology consult appreciated; follows with Dr. Trujillo at [facility]. They now recommend ureteral mass biopsy by urology and LN biopsy by IR, but both say they will do outpatient. -CT chest for staging.   -Urology recs CT urogram with contrast for staging once Cr improves/stabilizes further. -If can't get CT urogram, will get MRI urogram.   -MRI urogram: shows A circumferential enhancement and mural thickening of the right mid ureter measuring 1.0 cm, suggestive of ureteral lesion.  -F/u urology recs for ureteral lesion; they say outpatient bx.  - uric acid 10.1, given rasburicase 3 mg IV x1 on 5/11 with improvement of uric acid to 2.7  - trend LDH/ Uric Acid   - Ddimer/ fibrinogen  - IR consulted for LN biopsy; [facility] now recs LN biopsy, but IR says will do outpt. -Will get CT chest.

## 2024-05-17 NOTE — CONSULT NOTE ADULT - SUBJECTIVE AND OBJECTIVE BOX
Interventional Radiology    Evaluate for Procedure: retroperitoneal lymph node biopsy    HPI: 89y/o M with PMH of HTN, HLD, CKD, BPH s/p TURP 16 years ago at [facility] (doesn't recall urologist), SCC, and Low­grade B­cell lymphoproliferative neoplasm presents to ED complaining of R flank abdominal pain for the past 4 days but worsening since yesterday. CT scan with findings of a soft tissue filling defect in the right mid/distal ureter, measuring 1.5 cm, possibly neoplasm, moderate right hydroureteronephrosis, and significant retroperitoneal lymphadenopathy, which is concerning for a lymphoproliferative process such as lymphoma.     IR now being consulted for retroperitoneal lymph node biopsy    Allergies: penicillin (Rash)    Medications (Abx/Cardiac/Anticoagulation/Blood Products)  amLODIPine   Tablet: 5 milliGRAM(s) Oral (05-17 @ 05:26)  heparin   Injectable: 5000 Unit(s) SubCutaneous (05-17 @ 05:25)  metoprolol succinate ER: 50 milliGRAM(s) Oral (05-17 @ 05:26)    Data:  T(C): 37  HR: 75  BP: 165/74  RR: 18  SpO2: 94%    -WBC 8.85 / HgB 15.6 / Hct 46.9 / Plt 197  -Na 139 / Cl 105 / BUN 55 / Glucose 102  -K 4.1 / CO2 19 / Cr 2.02  -ALT -- / Alk Phos -- / T.Bili --  -INR 1.07 / PTT 29.5    Radiology:   CT Abdomen and Pelvis No Cont (05.10.24 @ 19:28)  FINDINGS:  LOWER CHEST: Trace right pleural effusion with subsegmental lower lobe   atelectasis.    LIVER: Within normal limits.  BILE DUCTS: Normal caliber.  GALLBLADDER: Cholelithiasis.  SPLEEN: Within normal limits.  PANCREAS: Within normal limits.  ADRENALS: Within normal limits.  KIDNEYS/URETERS: Soft tissue filling defect in the right mid/distal   ureter, measuring 1.5 cm in craniocaudal dimension (series 5, image 42)   with resultant moderate right hydroureteronephrosis. Left greater than   right renal atrophy. Bilateral renal cysts, one on the right side has a   thin septation (Bosniak II).    BLADDER: Multiple bladder diverticulum.  REPRODUCTIVE ORGANS: Prostate is enlarged. Central hypoattenuating focus   within the prostate extending to the level of the bladder, which may   represent TURP postsurgical changes.    BOWEL: Small hiatal hernia. Periampullary duodenal diverticulum. No bowel   obstruction. Appendix is normal.  PERITONEUM: No ascites.  VESSELS: Atherosclerotic changes.  RETROPERITONEUM/LYMPH NODES: Significant retroperitoneal and pelvic   lymphadenopathy with reference nodes as below:  Para-aortic lillie cluster measures 5.2 x 2.4 cm (3, 64)  Right common iliac node measures 1.2 x 1.2 cm (3, 102)  Right external iliac node measures 2.3 x 1.7 cm (3, 129).  ABDOMINAL WALL: Within normal limits.  BONES: Grade 1 retrolisthesis of L5 on S1. Degenerative changes.    IMPRESSION:  Soft tissue filling defect in the right mid/distal ureter, measuring 1.5   cm, possibly neoplasm. Resultant moderate right hydroureteronephrosis.    Significant retroperitoneal lymphadenopathy, which is concerning for a   lymphoproliferative process such as lymphoma.    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 CT scan with findings of a soft tissue filling defect in the right mid/distal ureter, measuring 1.5 cm, possibly neoplasm, moderate right hydroureteronephrosis, and significant retroperitoneal lymphadenopathy, which is concerning for a lymphoproliferative process such as lymphoma.     IR now being consulted for retroperitoneal lymph node biopsy    - Directly spoke with Dr. Christopher Alicea from [facility] who clarified that heme/onc wants the lymph node biopsy at this time to compare to ureteral bx in order to guide treatment plan  - Urology planning for outpatient ureteral bx  - Case reviewed with MD Salty Martin and approved for outpatient CT guided retroperitoneal lymph node biopsy   - Please have patient contact IR booking office at [phone] to schedule appointment    Princess Howard PA-C  Interventional Radiology  Call back ext. [phone], also available on TEAMS

## 2024-05-17 NOTE — CONSULT NOTE ADULT - CONSULT REQUESTED DATE/TIME
10-May-2024
10-May-2024 22:19
pt was taken to  as per MD order, pt was medically clear by the ED MD.   Med-locks where remove before going to .  report was given to  RN.
13-May-2024 13:33
pt made comfortable
12-May-2024 09:08
Assumed care of patient at 0730.  Patient resting in bed awake.  Patient educated about plan of care including pending transfer and agrees with plan.  No attempts to harm self or others and safety maintained.
Patient remains in agreement with transfer to inpatient unit and educated about SOH pending transfer.  Patient ate 75% of her breakfast.  Patient attending to ADL's independently.  Safety of patient maintained.
17-May-2024 11:51

## 2024-05-17 NOTE — PROGRESS NOTE ADULT - NS ATTEND AMEND GEN_ALL_CORE FT
Urology follow up notes, pending improvement in kidney function  imaging as appropriate for kidney function  continue otherwise current management per the above note
Agree with above assessment and plan.   Prefer IR guided LN biopsy and urology for ureteral mass biopsy prior to discharge to not delay care as likely will need treatment.

## 2024-05-17 NOTE — PROGRESS NOTE ADULT - PROBLEM SELECTOR PLAN 1
Pt. with resolving/resolved non oliguric LIA on CKD in the setting of obstructive uropathy from retroperitoneal masses (has known hx of lymphoma). No prior labs available for review on [place]/[facility]. As per pt. his Scr normally ranges between 1.7 to 2. Scr on admission was elevated at 5.06 (5/10) and peaked at 5.88 on 5/12. Pt. underwent PCN on 5/12 and Scr has improved/remains stable/at baseline to 2.02 today. UOP 7600cc via Castro and 1.3L via R PCN. Monitor labs and urine output. Avoid nephrotoxins. Dose medications as per eGFR. Pt. with resolving/resolved non oliguric LIA on CKD in the setting of obstructive uropathy from retroperitoneal masses (has known hx of lymphoma). No prior labs available for review on [place]/[facility]. As per pt. his Scr normally ranges between 1.7 to 2. Scr on admission was elevated at 5.06 (5/10) and peaked at 5.88 on 5/12. Pt. underwent PCN on 5/12 and Scr has improved/remains stable/at baseline to 2.02 today. UOP 600cc via Castro and 1.3L via R PCN. Monitor labs and urine output. Avoid nephrotoxins. Dose medications as per eGFR.    Will discontinue follow up. Reconsult as needed.  If you have any questions, please feel free to contact me  Mihai Espinoza  Nephrology Fellow  Pager # [phone]  [employer] Teams  (After 4pm or on weekends please page the on-call fellow)

## 2024-05-17 NOTE — PROGRESS NOTE ADULT - PROBLEM SELECTOR PLAN 7
- DVT ppx:  HSQ BID.   - GI ppx: none  - Diet: Renal restrictions were lifted given renal recovery. Soft and bite sized per patient preference.   - IVF: prn    8. D/w ACP Roque and IR and urology and Dr. Alicea onco.   -Updated sister Jeanette over the phone on 5/15 and 5/17. Patient lives with her.  -Tentative Plan for DC to home tomorrow with home PT and close outpatient oncology, urology, IR, and PMD follow up.

## 2024-05-17 NOTE — PROGRESS NOTE ADULT - PROBLEM SELECTOR PLAN 1
suspect 2/2 obstructive renal  - creatinine trend: 5.00 -> 5.21 -> 5.88 -> 4.33 -> 3.34 -> 2.39 -> 2.06 ->2.02.   - secondary to obstructive process, -S/p right PCN on 5/12.   - renal consult appreciated  - renal dose medications  - monitor ins and outs  - monitor creatinine (baseline around 2 or less per patient)  - avoid nephrotoxins  -s/p IV LR at 75cc/hr x 12 hours. encouraged po fluid intake increase.

## 2024-05-18 ENCOUNTER — TRANSCRIPTION ENCOUNTER (OUTPATIENT)
Age: 89
End: 2024-05-18

## 2024-05-18 VITALS
SYSTOLIC BLOOD PRESSURE: 136 MMHG | HEART RATE: 77 BPM | DIASTOLIC BLOOD PRESSURE: 66 MMHG | OXYGEN SATURATION: 96 % | RESPIRATION RATE: 18 BRPM | TEMPERATURE: 98 F

## 2024-05-18 LAB
ANION GAP SERPL CALC-SCNC: 13 MMOL/L — SIGNIFICANT CHANGE UP (ref 5–17)
BUN SERPL-MCNC: 46 MG/DL — HIGH (ref 7–23)
CALCIUM SERPL-MCNC: 9.1 MG/DL — SIGNIFICANT CHANGE UP (ref 8.4–10.5)
CHLORIDE SERPL-SCNC: 106 MMOL/L — SIGNIFICANT CHANGE UP (ref 96–108)
CO2 SERPL-SCNC: 20 MMOL/L — LOW (ref 22–31)
CREAT SERPL-MCNC: 1.82 MG/DL — HIGH (ref 0.5–1.3)
EGFR: 36 ML/MIN/1.73M2 — LOW
GLUCOSE SERPL-MCNC: 95 MG/DL — SIGNIFICANT CHANGE UP (ref 70–99)
HCT VFR BLD CALC: 44.8 % — SIGNIFICANT CHANGE UP (ref 39–50)
HGB BLD-MCNC: 14.7 G/DL — SIGNIFICANT CHANGE UP (ref 13–17)
MAGNESIUM SERPL-MCNC: 2.2 MG/DL — SIGNIFICANT CHANGE UP (ref 1.6–2.6)
MCHC RBC-ENTMCNC: 28.5 PG — SIGNIFICANT CHANGE UP (ref 27–34)
MCHC RBC-ENTMCNC: 32.8 GM/DL — SIGNIFICANT CHANGE UP (ref 32–36)
MCV RBC AUTO: 87 FL — SIGNIFICANT CHANGE UP (ref 80–100)
NRBC # BLD: 0 /100 WBCS — SIGNIFICANT CHANGE UP (ref 0–0)
PLATELET # BLD AUTO: 230 K/UL — SIGNIFICANT CHANGE UP (ref 150–400)
POTASSIUM SERPL-MCNC: 4.4 MMOL/L — SIGNIFICANT CHANGE UP (ref 3.5–5.3)
POTASSIUM SERPL-SCNC: 4.4 MMOL/L — SIGNIFICANT CHANGE UP (ref 3.5–5.3)
RBC # BLD: 5.15 M/UL — SIGNIFICANT CHANGE UP (ref 4.2–5.8)
RBC # FLD: 12.8 % — SIGNIFICANT CHANGE UP (ref 10.3–14.5)
SODIUM SERPL-SCNC: 139 MMOL/L — SIGNIFICANT CHANGE UP (ref 135–145)
WBC # BLD: 5.25 K/UL — SIGNIFICANT CHANGE UP (ref 3.8–10.5)
WBC # FLD AUTO: 5.25 K/UL — SIGNIFICANT CHANGE UP (ref 3.8–10.5)

## 2024-05-18 PROCEDURE — 84550 ASSAY OF BLOOD/URIC ACID: CPT

## 2024-05-18 PROCEDURE — 86900 BLOOD TYPING SEROLOGIC ABO: CPT

## 2024-05-18 PROCEDURE — C1729: CPT

## 2024-05-18 PROCEDURE — 82962 GLUCOSE BLOOD TEST: CPT

## 2024-05-18 PROCEDURE — 50432 PLMT NEPHROSTOMY CATHETER: CPT

## 2024-05-18 PROCEDURE — 83615 LACTATE (LD) (LDH) ENZYME: CPT

## 2024-05-18 PROCEDURE — 80053 COMPREHEN METABOLIC PANEL: CPT

## 2024-05-18 PROCEDURE — C1894: CPT

## 2024-05-18 PROCEDURE — 85025 COMPLETE CBC W/AUTO DIFF WBC: CPT

## 2024-05-18 PROCEDURE — 74176 CT ABD & PELVIS W/O CONTRAST: CPT | Mod: MC

## 2024-05-18 PROCEDURE — 84132 ASSAY OF SERUM POTASSIUM: CPT

## 2024-05-18 PROCEDURE — 85027 COMPLETE CBC AUTOMATED: CPT

## 2024-05-18 PROCEDURE — 86850 RBC ANTIBODY SCREEN: CPT

## 2024-05-18 PROCEDURE — 76700 US EXAM ABDOM COMPLETE: CPT

## 2024-05-18 PROCEDURE — 96374 THER/PROPH/DIAG INJ IV PUSH: CPT

## 2024-05-18 PROCEDURE — 82435 ASSAY OF BLOOD CHLORIDE: CPT

## 2024-05-18 PROCEDURE — 85730 THROMBOPLASTIN TIME PARTIAL: CPT

## 2024-05-18 PROCEDURE — 80048 BASIC METABOLIC PNL TOTAL CA: CPT

## 2024-05-18 PROCEDURE — 97161 PT EVAL LOW COMPLEX 20 MIN: CPT

## 2024-05-18 PROCEDURE — 83605 ASSAY OF LACTIC ACID: CPT

## 2024-05-18 PROCEDURE — 83690 ASSAY OF LIPASE: CPT

## 2024-05-18 PROCEDURE — 71250 CT THORAX DX C-: CPT | Mod: MC

## 2024-05-18 PROCEDURE — 94640 AIRWAY INHALATION TREATMENT: CPT

## 2024-05-18 PROCEDURE — 99285 EMERGENCY DEPT VISIT HI MDM: CPT | Mod: 25

## 2024-05-18 PROCEDURE — 82330 ASSAY OF CALCIUM: CPT

## 2024-05-18 PROCEDURE — 85362 FIBRIN DEGRADATION PRODUCTS: CPT

## 2024-05-18 PROCEDURE — 85610 PROTHROMBIN TIME: CPT

## 2024-05-18 PROCEDURE — 85018 HEMOGLOBIN: CPT

## 2024-05-18 PROCEDURE — 72197 MRI PELVIS W/O & W/DYE: CPT | Mod: MC

## 2024-05-18 PROCEDURE — 84100 ASSAY OF PHOSPHORUS: CPT

## 2024-05-18 PROCEDURE — A9585: CPT

## 2024-05-18 PROCEDURE — 86901 BLOOD TYPING SEROLOGIC RH(D): CPT

## 2024-05-18 PROCEDURE — 82803 BLOOD GASES ANY COMBINATION: CPT

## 2024-05-18 PROCEDURE — 87086 URINE CULTURE/COLONY COUNT: CPT

## 2024-05-18 PROCEDURE — 85014 HEMATOCRIT: CPT

## 2024-05-18 PROCEDURE — 82947 ASSAY GLUCOSE BLOOD QUANT: CPT

## 2024-05-18 PROCEDURE — C1769: CPT

## 2024-05-18 PROCEDURE — 99239 HOSP IP/OBS DSCHRG MGMT >30: CPT

## 2024-05-18 PROCEDURE — 84295 ASSAY OF SERUM SODIUM: CPT

## 2024-05-18 PROCEDURE — 81001 URINALYSIS AUTO W/SCOPE: CPT

## 2024-05-18 PROCEDURE — 96375 TX/PRO/DX INJ NEW DRUG ADDON: CPT

## 2024-05-18 PROCEDURE — 74183 MRI ABD W/O CNTR FLWD CNTR: CPT | Mod: MC

## 2024-05-18 PROCEDURE — 83735 ASSAY OF MAGNESIUM: CPT

## 2024-05-18 PROCEDURE — 85379 FIBRIN DEGRADATION QUANT: CPT

## 2024-05-18 RX ORDER — LANOLIN ALCOHOL/MO/W.PET/CERES
1 CREAM (GRAM) TOPICAL
Qty: 0 | Refills: 0 | DISCHARGE
Start: 2024-05-18

## 2024-05-18 RX ADMIN — AMLODIPINE BESYLATE 5 MILLIGRAM(S): 2.5 TABLET ORAL at 05:36

## 2024-05-18 RX ADMIN — HEPARIN SODIUM 5000 UNIT(S): 5000 INJECTION INTRAVENOUS; SUBCUTANEOUS at 05:37

## 2024-05-18 RX ADMIN — Medication 50 MILLIGRAM(S): at 05:37

## 2024-05-18 NOTE — PROGRESS NOTE ADULT - PROBLEM SELECTOR PROBLEM 4
Retroperitoneal lymphadenopathy

## 2024-05-18 NOTE — PROGRESS NOTE ADULT - PROBLEM SELECTOR PLAN 6
- continue home amlodipine 5mg daily  - continue home toprol XL 50mg daily  -outpt f/u of BP with PMD. Given age, will not be too aggressive at this time.
- continue home amlodipine 5mg daily  - continue home toprol XL 50mg daily
- continue home amlodipine 5mg daily  - continue home toprol XL 50mg daily  -outpt f/u of BP with PMD. Given age, will not be too aggressive at this time.
- continue home amlodipine 5mg daily  - continue home troprol XL 50mg daily
-c/w flomax

## 2024-05-18 NOTE — PROGRESS NOTE ADULT - PROBLEM SELECTOR PROBLEM 6
Hypertension
BPH with urinary obstruction
Hypertension

## 2024-05-18 NOTE — PROGRESS NOTE ADULT - PROVIDER SPECIALTY LIST ADULT
Heme/Onc
Heme/Onc
Hospitalist
Nephrology
Nephrology
Urology
Hospitalist
Nephrology
Urology
Intervent Radiology
Urology
Urology
Nephrology
Internal Medicine
Internal Medicine

## 2024-05-18 NOTE — PROGRESS NOTE ADULT - PROBLEM SELECTOR PLAN 7
- DVT ppx:  HSQ BID.   - GI ppx: none  - Diet: Renal restrictions were lifted given renal recovery. Soft and bite sized per patient preference.   - IVF: prn    8. D/w ACP Teresa.   -Updated sister Jeanette over the phone on 5/15 and 5/17 and 5/18. Patient lives with her.  -RW Rx given.  -DC to home today with home PT and close outpatient oncology, urology, IR, and PMD follow up. -36 minutes spent on the DC process.

## 2024-05-18 NOTE — PROGRESS NOTE ADULT - PROBLEM SELECTOR PLAN 1
suspect 2/2 obstructive renal  - creatinine trend: 5.00 -> 5.21 -> 5.88 -> 4.33 -> 3.34 -> 2.39 -> 2.06 ->2.02 -> 1.82.   - secondary to obstructive process, -S/p right PCN on 5/12.   - renal consult appreciated  - renal dose medications  - monitor ins and outs  - monitor creatinine (baseline around 2 or less per patient)  - avoid nephrotoxins  -s/p IV LR at 75cc/hr x 12 hours. encouraged po fluid intake increase.

## 2024-05-18 NOTE — PROGRESS NOTE ADULT - PROBLEM SELECTOR PLAN 2
- urology consult appreciated  - s/p attempted manuel insertion on 5/10, failed  - manuel now placed by urology on 5/11, will maintain.   - IR placed right PCN on 5/12.   - antibiotic prophylaxis due to  instrumentation, plan for 3 days of antibiotics (s/p ciprofloxacin on 5/10, c/w aztreonam 5/11 - 5/12)
- urology consult appreciated  - s/p attempted manuel insertion on 5/10, failed  - manuel now placed by urology on 5/11, will maintain. -Do not remove without discussion with urology team.   - IR placed right PCN on 5/12.   - antibiotic prophylaxis due to  instrumentation, plan for 3 days of antibiotics (s/p ciprofloxacin on 5/10, c/w aztreonam 5/11 - 5/12)
- urology consult appreciated  - s/p attempted manuel insertion on 5/10, failed  - manuel now placed by urology on 5/11, will maintain. -Do not remove without discussion with urology team -keep until outpt f/u.   - IR placed right PCN on 5/12.   - antibiotic prophylaxis due to  instrumentation, plan for 3 days of antibiotics (s/p ciprofloxacin on 5/10, c/w aztreonam 5/11 - 5/12)
- urology consult appreciated  - s/p attempted manuel insertion on 5/10, failed  - manuel now placed by urology on 5/11, will maintain  - IR consult , consult order placed   - antibiotic prophylaxis due to  instrumentation, plan for 3 days of antibiotics (s/p ciprofloxacin on 5/10, c/w aztreonam 5/11 - 5/12)
- urology consult appreciated  - s/p attempted manuel insertion on 5/10, failed  - manuel now placed by urology on 5/11, will maintain. -Do not remove without discussion with urology team -keep until outpt f/u.   - IR placed right PCN on 5/12.   - antibiotic prophylaxis due to  instrumentation, plan for 3 days of antibiotics (s/p ciprofloxacin on 5/10, c/w aztreonam 5/11 - 5/12)
- urology consult appreciated  - s/p attempted manuel insertion on 5/10, failed  - manuel now placed by urology on 5/11, will maintain. -Do not remove without discussion with urology team.   - IR placed right PCN on 5/12.   - antibiotic prophylaxis due to  instrumentation, plan for 3 days of antibiotics (s/p ciprofloxacin on 5/10, c/w aztreonam 5/11 - 5/12)
 attempted to place manuel without success , assessed that stent placement would be difficult in the setting of a 1.5cm mass recommends IR consult for PCN   -  consult appreciated  - s/p attempted manuel insertion on 5/10, failed  - manuel now placed by urology on 5/11, will maintain  - IR consult , consult order placed   - Aztreonam , for prophylaxis due to  instrumentation, plan for 3 day course
- urology consult appreciated  - s/p attempted manuel insertion on 5/10, failed  - manuel now placed by urology on 5/11, will maintain. -Do not remove without discussion with urology team.   - IR placed right PCN on 5/12.   - antibiotic prophylaxis due to  instrumentation, plan for 3 days of antibiotics (s/p ciprofloxacin on 5/10, c/w aztreonam 5/11 - 5/12)

## 2024-05-18 NOTE — PROGRESS NOTE ADULT - PROBLEM SELECTOR PLAN 3
-c/w flomax  - prostate volume 120 cc on CT  - urology onboard  - manuel in place
-c/w flomax  - prostate volume 120 cc on CT  - urology onboard  - manuel in place, monitor mild hematuria. -Do not remove without discussion with urology team.   -Bowel regimen.
-c/w flomax  - prostate volume 120 cc on CT  - urology onboard  - manuel in place, monitor mild hematuria. -Do not remove without discussion with urology team - keep until outpt f/u.   -Bowel regimen.
-c/w flomax  - prostate volume 120 cc on CT  - urology onboard  - manuel in place, monitor mild hematuria. -Do not remove without discussion with urology team.   -Bowel regimen.
-c/w flomax  - prostate volume 120 cc on CT  - urology onboard  - manuel in place, monitor mild hematuria. -Do not remove without discussion with urology team.   -Bowel regimen.
-c/w flomax  - prostate volume 120 cc on CT  - urology onboard  - manuel in place, monitor mild hematuria. -Do not remove without discussion with urology team - keep until outpt f/u.   -Bowel regimen.
-c/w flomax  - prostate volume 120 cc on CT  - urology onboard  - manuel in place, monitor mild hematuria. -Do not remove without discussion with urology team.   -Bowel regimen.
s/p lokelma  , Insulin and dextrose , and IV fluid bolus , repeat k 4.8   - monitor serum K   - additional Lokelma  can be used PRN

## 2024-05-18 NOTE — PROGRESS NOTE ADULT - PROBLEM SELECTOR PLAN 4
Significant retroperitoneal lymphadenopathy, which is concerning for a lymphoproliferative process such as lymphoma.   - Oncology consult appreciated; follows with Dr. Trujillo at [facility]. They now recommend ureteral mass biopsy by urology and LN biopsy by IR, but both say they will do outpatient. -CT chest for staging showed Mildly enlarged left supraclavicular, left axillary and paraortic lymph nodes. -outpt f/u   -Urology recs CT urogram with contrast for staging once Cr improves/stabilizes further. -If can't get CT urogram, will get MRI urogram.   -MRI urogram: shows A circumferential enhancement and mural thickening of the right mid ureter measuring 1.0 cm, suggestive of ureteral lesion.  -F/u urology recs for ureteral lesion; they say outpatient bx.  - uric acid 10.1, given rasburicase 3 mg IV x1 on 5/11 with improvement of uric acid to 2.7  - trend LDH/ Uric Acid   - Ddimer/ fibrinogen  - IR consulted for LN biopsy; [facility] now recs LN biopsy, but IR says will do outpt. -Will get CT chest- as above.

## 2024-05-18 NOTE — PROGRESS NOTE ADULT - ASSESSMENT
88y/o M with PMH of HTN, HLD, CKD, BPH s/p TURP 16 years ago at [facility] (doesn't recall urologist), and SCC presents to ED complaining of R flank abdominal pain for the past 4 days but worsening since yesterday.  CT scan with findings of a soft tissue filling defect in the right mid/distal ureter, measuring 1.5 cm, possibly neoplasm, moderate right hydroureteronephrosis, and significant retroperitoneal lymphadenopathy, which is concerning for a lymphoproliferative process such as lymphoma.  Hyperkalemia now corrected with lokelma     s/p R NT 5/12. Now with downtrending Cr.     Plan:  -Cr improved to 2  - MRU reviewed with lymphadenopathy and R ureteral mass  - Patient will need biopsy of lymphnode with IR. Please consult them.   - Also outpatient ureteroscopy for biopsy of ureteral tumor.   - Please Follow up with Dr. acosta as an outpatient.     D/w Dr. Acosta  [facility]  [address] [place] [address] [phone].  
88y/o M with PMH of HTN, HLD, CKD, BPH s/p TURP 16 years ago at [facility] (doesn't recall urologist), and SCC presents to ED complaining of R flank abdominal pain for the past 4 days but worsening since yesterday.  CT scan with findings of a soft tissue filling defect in the right mid/distal ureter, measuring 1.5 cm, possibly neoplasm, moderate right hydroureteronephrosis, and significant retroperitoneal lymphadenopathy, which is concerning for a lymphoproliferative process such as lymphoma.  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 Jama [facility]  [address] [place] [address] [phone] 
88y/o M with PMH of HTN, HLD, CKD, BPH s/p TURP 16 years ago at [facility] (doesn't recall urologist), and SCC presents to ED complaining of R flank abdominal pain for the past 4 days but worsening since yesterday.  CT scan with findings of a soft tissue filling defect in the right mid/distal ureter, measuring 1.5 cm, possibly neoplasm, moderate right hydroureteronephrosis, and significant retroperitoneal lymphadenopathy, which is concerning for a lymphoproliferative process such as lymphoma.  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 Yun  [facility]  [address] [place] [address] [phone] 
Patient is an 89 year old male w/pmhx HTN, BPH, HLD, basal and squamous cell carcinomas s/p Mohs admitted with acute kidney injury with concern for ureteral mass.     Low­grade B­cell lymphoproliferative neoplasm   - Follows with Dr. Trujillo at [facility]. On surveillance  - Last flow cytometry on 3/21/2024 was normal  - PET 09/2023: Again is noted hypermetabolic nodes above and below the diaphragm in the same distribution with interval variable hypermetabolic changes consistent with active lymphomatous disease. PET score according to  Lugano classification is 5. Interval metabolic resolution of cutaneous activity in the left scalp at the vertex. No distinct FDG avid cutaneous lesion given some skin lesions may be below PET resolution. Continued surveillance dermatologic exam recommended.  - His lymphadenopathy appears to be stable.  - CT AP: Soft tissue filling defect in the right mid/distal ureter, measuring 1.5 cm, possibly neoplasm. Resultant moderate right hydroureteronephrosis. Significant retroperitoneal lymphadenopathy, which is concerning for a lymphoproliferative process such as lymphoma. This was not apparent on prior imaging.  - Received rasburicase 5/11, uric acid now 2.7  - Recommend urology work up including biopsy of ureteral mass. Need improvement of creatinine for CT urogram to better characterize the ureteral lesion prior to biopsy. Per urology, if unable to receive contrast will need MR urogram.   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    Will continue to follow.    Kenroy Che PA-C  Hematology/Oncology  [facility]  [phone] (office)
Patient is an 89 year old male w/pmhx HTN, BPH, HLD, basal and squamous cell carcinomas s/p Mohs admitted with acute kidney injury with concern for ureteral mass.     Low­grade B­cell lymphoproliferative neoplasm   - Follows with Dr. Trujillo at [facility]. On surveillance  - Last flow cytometry on 3/21/2024 was normal  - PET 09/2023: Again is noted hypermetabolic nodes above and below the diaphragm in the same distribution with interval variable hypermetabolic changes consistent with active lymphomatous disease. PET score according to  Lugano classification is 5. Interval metabolic resolution of cutaneous activity in the left scalp at the vertex. No distinct FDG avid cutaneous lesion given some skin lesions may be below PET resolution. Continued surveillance dermatologic exam recommended.  - His lymphadenopathy appears to be stable.  - CT AP: Soft tissue filling defect in the right mid/distal ureter, measuring 1.5 cm, possibly neoplasm. Resultant moderate right hydroureteronephrosis. Significant retroperitoneal lymphadenopathy, which is concerning for a lymphoproliferative process such as lymphoma. This was not apparent on prior imaging.  - Received rasburicase 5/11, uric acid now 2.7  - IR consulted for retroperitoneal lymph node biopsy to compare to ureteral bx in order to guide treatment plan  - Recommend urology work up including biopsy of ureteral mass. Need improvement of creatinine for CT urogram to better characterize the ureteral lesion prior to biopsy. Per urology, if unable to receive contrast will need MR urogram.   - MR a/p done 5/16  w/  1.  An circumferential enhancement and mural thickening of the right mid ureter measuring 1.0 cm, suggestive of ureteral lesion.  2.  Interval placement of the right nephrostomy tube with decrease in right hydronephrosis.  3.  Few nonenhancing T1 hyperintensities in the right renal pelvis, mid ureter, and urinary bladder, suggestive of hematoma associated withnephrostomy procedure.    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    Will continue to follow.    Marcia Slade NP  Hematology/ Oncology  [facility]  [phone] (office)  [phone] (alt office)  Evenings and weekends please call MD on call or office  
89M  w/pmhx HTN, BPH, HLD, p/w abdominal pain x 4 days. He is found to have soft tissue filling defected in right ureter, possible neoplasm, as well as significant RP lymphadenopathy c/f lymphoma. 
89y/o M with PMH of HTN, HLD, CKD, BPH s/p TURP 16 years ago at [facility] (doesn't recall urologist), and SCC presents to ED complaining of R flank abdominal pain for the past 4 days but worsening since yesterday.  CT scan with findings of a soft tissue filling defect in the right mid/distal ureter, measuring 1.5 cm, possibly neoplasm, moderate right hydroureteronephrosis, and significant retroperitoneal lymphadenopathy, which is concerning for a lymphoproliferative process such as lymphoma.  Hyperkalemia now corrected with lokelma     s/p R NT 5/12. Now with downtrending Cr.     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   -At this time, important for Cr to downtrend, when acceptable, will need further imaging- specifically, CT urogram (with IV contrast, delayed imaging) to better characterize the ureteral lesion). This study must be done prior to plans for biopsy. If Cr downtrends well and patient remains inpatient, can obtain CT urogram with Cr is acceptable for contrast. If patient Cr is not acceptable to receive contrast- will need MR urogram.   -Castro in place - do not remove without speaking to urology - placed with cystoscopy.   -Urine cx negative     D/w Dr. Jama [facility]  [address] [place] [address] [phone].  
LIA, acidosis and hyperkalemia all likely due to obstruction from RP lesions. Soft tissue filling defect in the right mid/distal ureter, measuring 1.5   cm, possibly neoplasm. Resultant moderate right hydroureteronephrosis noted. Significant retroperitoneal lymphadenopathy, which is concerning for a lymphoproliferative process such as lymphoma. TLS also in ddx as LDH is high and Uric acid is 9 ( but this could be from LIA as well).  Renal sonogram and CT noted. 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 IR for Right sided PCN given left kidney atrophic  Lokelma to keep K down  If PCN will be significantly delayed, may need HD if crt and K continues to worsen but so far crt and K stable  s/p 1 dose of rasburicase  Castro in place  Heme eval done for potential lymphoma      Ruchi Wick MD  Office   Contact me directly via Microsoft Teams     (After 5 pm or on weekends please page the on-call fellow/attending, can check [email or web address] for schedule. Login is judah graf, schedule under [facility] medicine, psych, derm)    
Pt. with LIA on CKD, hyperkalemia, and metabolic acidosis.
Pt. with LIA on CKD in setting of obstructive uropathy.
Pt. with LIA on CKD in setting of obstructive uropathy.
89M  w/pmhx HTN, BPH, HLD, p/w abdominal pain x 4 days. He is found to have soft tissue filling defected in right ureter, possible neoplasm, as well as significant RP lymphadenopathy c/f lymphoma. 

## 2024-05-18 NOTE — PROGRESS NOTE ADULT - SUBJECTIVE AND OBJECTIVE BOX
[facility] DIVISION OF KIDNEY DISEASES AND HYPERTENSION   FOLLOW UP NOTE  --------------------------------------------------------------------------------  Chief Complaint: Pt with non oliguric LIA on CKD    24 hour events/subjective: Pt. seen and examined. Pt is feeling well. Pt remains non oliguric. Denies fevers/chills, HA, CP, SOB, dysuria, or leg swelling.    PAST HISTORY  --------------------------------------------------------------------------------  No significant changes to PMH, PSH, FHx, SHx, unless otherwise noted    ALLERGIES & MEDICATIONS  --------------------------------------------------------------------------------  Allergies  penicillin (Rash)    Intolerances    Standing Inpatient Medications  amLODIPine   Tablet 5 milliGRAM(s) Oral daily  atorvastatin 20 milliGRAM(s) Oral at bedtime  heparin   Injectable 5000 Unit(s) SubCutaneous every 12 hours  lactated ringers. 1000 milliLiter(s) IV Continuous <Continuous>  metoprolol succinate ER 50 milliGRAM(s) Oral daily  polyethylene glycol 3350 17 Gram(s) Oral <User Schedule>  senna 2 Tablet(s) Oral at bedtime  tamsulosin 0.4 milliGRAM(s) Oral at bedtime    PRN Inpatient Medications  acetaminophen     Tablet .. 650 milliGRAM(s) Oral every 6 hours PRN  aluminum hydroxide/magnesium hydroxide/simethicone Suspension 30 milliLiter(s) Oral every 6 hours PRN  melatonin 3 milliGRAM(s) Oral at bedtime PRN  ondansetron Injectable 4 milliGRAM(s) IV Push every 8 hours PRN    REVIEW OF SYSTEMS  --------------------------------------------------------------------------------  Gen: No fevers/chills  Head/Eyes/Ears: No HA   Respiratory: No dyspnea, cough  CV: No chest pain  GI: No abdominal pain, diarrhea  : +R PCN, +Castro  MSK: No edema  Skin: No rashes  Heme: No easy bruising or bleeding    All other systems were reviewed and are negative, except as noted.    VITALS/PHYSICAL EXAM  --------------------------------------------------------------------------------  T(C): 37 (05-17-24 @ 04:36), Max: 37 (05-17-24 @ 04:36)  HR: 75 (05-17-24 @ 04:36) (75 - 85)  BP: 165/74 (05-17-24 @ 04:36) (120/64 - 165/74)  RR: 18 (05-17-24 @ 04:36) (18 - 19)  SpO2: 94% (05-17-24 @ 04:36) (94% - 95%)  Wt(kg): --    05-16-24 @ 07:01  -  05-17-24 @ 07:00  --------------------------------------------------------  IN: 540 mL / OUT: 1900 mL / NET: -1360 mL    Physical Exam:  Gen: NAD  HEENT: MMM  Pulm: CTA B/L  CV: S1S2  Abd: Distended but soft  : +R PCN, +Castro catheter  Ext: No B/L LE edema  Neuro: Awake  Skin: Warm and dry, petechial rash in LE B/L    LABS/STUDIES  --------------------------------------------------------------------------------    139  |  105  |  55  ----------------------------<  102      [05-17-24 @ 06:54]  4.1   |  19  |  2.02        Ca     8.8     [05-17-24 @ 06:54]    Uric acid 7.0      [05-17-24 @ 06:54]        [05-17-24 @ 06:54]    Creatinine Trend:  SCr 2.02 [05-17 @ 06:54]  SCr 2.06 [05-16 @ 07:04]  SCr 2.39 [05-15 @ 06:48]  SCr 3.34 [05-14 @ 07:26]  SCr 4.33 [05-13 @ 07:01]
[facility] DIVISION OF KIDNEY DISEASES AND HYPERTENSION -- FOLLOW UP NOTE  --------------------------------------------------------------------------------  Chief Complaint:    24 hour events/subjective:  urology and heme eval noted    PAST HISTORY  --------------------------------------------------------------------------------  No significant changes to PMH, PSH, FHx, SHx, unless otherwise noted    ALLERGIES & MEDICATIONS  --------------------------------------------------------------------------------  Allergies    penicillin (Rash)    Intolerances      Standing Inpatient Medications  amLODIPine   Tablet 5 milliGRAM(s) Oral daily  atorvastatin 20 milliGRAM(s) Oral at bedtime  aztreonam  IVPB 1000 milliGRAM(s) IV Intermittent <User Schedule>  metoprolol succinate ER 50 milliGRAM(s) Oral daily  polyethylene glycol 3350 17 Gram(s) Oral <User Schedule>  senna 2 Tablet(s) Oral at bedtime  tamsulosin 0.4 milliGRAM(s) Oral at bedtime    PRN Inpatient Medications  acetaminophen     Tablet .. 650 milliGRAM(s) Oral every 6 hours PRN  melatonin 3 milliGRAM(s) Oral at bedtime PRN  ondansetron Injectable 4 milliGRAM(s) IV Push every 8 hours PRN      REVIEW OF SYSTEMS  --------------------------------------------------------------------------------  Gen: No weight changes, fatigue, fevers/chills, weakness  Skin: No rashes  Head/Eyes/Ears/Mouth: No headache; Normal hearing; Normal vision w/o blurriness; No sinus pain/discomfort, sore throat  Respiratory: No dyspnea, cough, wheezing, hemoptysis  CV: No chest pain, PND, orthopnea  GI: No abdominal pain, diarrhea, constipation, nausea, vomiting, melena, hematochezia  : No increased frequency, dysuria, hematuria, nocturia  MSK: No joint pain/swelling; no back pain; no edema  Neuro: No dizziness/lightheadedness, weakness, seizures, numbness, tingling  Heme: No easy bruising or bleeding  Endo: No heat/cold intolerance  Psych: No significant nervousness, anxiety, stress, depression    All other systems were reviewed and are negative, except as noted.    VITALS/PHYSICAL EXAM  --------------------------------------------------------------------------------  T(C): 36.6 (05-12-24 @ 05:07), Max: 36.6 (05-11-24 @ 12:05)  HR: 92 (05-12-24 @ 05:07) (85 - 97)  BP: 163/61 (05-12-24 @ 05:07) (137/70 - 176/82)  RR: 18 (05-12-24 @ 05:07) (18 - 18)  SpO2: 94% (05-12-24 @ 05:07) (94% - 96%)  Wt(kg): --  Height (cm): 170.2 (05-10-24 @ 16:55)  Weight (kg): 64 (05-10-24 @ 16:55)  BMI (kg/m2): 22.1 (05-10-24 @ 16:55)  BSA (m2): 1.74 (05-10-24 @ 16:55)      05-11-24 @ 07:01  -  05-12-24 @ 07:00  --------------------------------------------------------  IN: 240 mL / OUT: 400 mL / NET: -160 mL      Physical Exam:  	Gen: NAD, well-appearing  	HEENT: PERRL, supple neck, clear oropharynx  	Pulm: CTA B/L  	CV: RRR, S1S2; no rub  	Back: No spinal or CVA tenderness; no sacral edema  	Abd: +BS, soft, nontender/nondistended  	: No suprapubic tenderness  	UE: Warm, FROM, no clubbing, intact strength; no edema; no asterixis  	LE: Warm, FROM, no clubbing, intact strength; no edema  	Neuro: No focal deficits, intact gait  	Psych: Normal affect and mood  	Skin: Warm, without rashes  	Vascular access:    LABS/STUDIES  --------------------------------------------------------------------------------              16.0   9.71  >-----------<  184      [05-12-24 @ 07:11]              46.9     134  |  99  |  94  ----------------------------<  117      [05-12-24 @ 07:11]  4.8   |  15  |  5.88        Ca     9.1     [05-12-24 @ 07:11]    TPro  6.7  /  Alb  3.2  /  TBili  1.0  /  DBili  x   /  AST  14  /  ALT  8   /  AlkPhos  71  [05-11-24 @ 07:22]    PT/INR: PT 11.2 , INR 1.07       [05-12-24 @ 07:11]  PTT: 29.5       [05-12-24 @ 07:11]    Uric acid 2.7      [05-12-24 @ 07:11]        [05-12-24 @ 07:11]    Creatinine Trend:  SCr 5.88 [05-12 @ 07:11]  SCr 5.21 [05-11 @ 07:22]  SCr 5.00 [05-10 @ 19:18]  SCr 5.06 [05-10 @ 17:52]    Urinalysis - [05-12-24 @ 07:11]      Color  / Appearance  / SG  / pH       Gluc 117 / Ketone   / Bili  / Urobili        Blood  / Protein  / Leuk Est  / Nitrite       RBC  / WBC  / Hyaline  / Gran  / Sq Epi  / Non Sq Epi  / Bacteria           
Urology Progress Note     Subjective/24hour Events:   Patient seen and examined.   No acute events overnight.   Pain controlled.     Vital Signs:  Vital Signs Last 24 Hrs  T(C): 36.4 (16 May 2024 14:39), Max: 36.9 (16 May 2024 04:29)  T(F): 97.5 (16 May 2024 14:39), Max: 98.4 (16 May 2024 04:29)  HR: 85 (16 May 2024 14:39) (67 - 85)  BP: 120/64 (16 May 2024 14:39) (120/64 - 169/70)  BP(mean): --  RR: 19 (16 May 2024 14:39) (18 - 19)  SpO2: 95% (16 May 2024 14:39) (95% - 97%)    Parameters below as of 16 May 2024 14:39  Patient On (Oxygen Delivery Method): room air        CAPILLARY BLOOD GLUCOSE          I&O's Detail    15 May 2024 07:01  -  16 May 2024 07:00  --------------------------------------------------------  IN:    Oral Fluid: 240 mL  Total IN: 240 mL    OUT:    Indwelling Catheter - Urethral (mL): 700 mL    Nephrostomy Tube (mL): 1200 mL    Voided (mL): 700 mL  Total OUT: 2600 mL    Total NET: -2360 mL      16 May 2024 07:01  -  16 May 2024 18:45  --------------------------------------------------------  IN:    Oral Fluid: 300 mL  Total IN: 300 mL    OUT:    Nephrostomy Tube (mL): 500 mL  Total OUT: 500 mL    Total NET: -200 mL          MEDICATIONS  (STANDING):  amLODIPine   Tablet 5 milliGRAM(s) Oral daily  atorvastatin 20 milliGRAM(s) Oral at bedtime  heparin   Injectable 5000 Unit(s) SubCutaneous every 12 hours  lactated ringers. 1000 milliLiter(s) (75 mL/Hr) IV Continuous <Continuous>  metoprolol succinate ER 50 milliGRAM(s) Oral daily  polyethylene glycol 3350 17 Gram(s) Oral <User Schedule>  senna 2 Tablet(s) Oral at bedtime  tamsulosin 0.4 milliGRAM(s) Oral at bedtime    MEDICATIONS  (PRN):  acetaminophen     Tablet .. 650 milliGRAM(s) Oral every 6 hours PRN Temp greater or equal to 38C (100.4F), Mild Pain (1 - 3)  aluminum hydroxide/magnesium hydroxide/simethicone Suspension 30 milliLiter(s) Oral every 6 hours PRN Dyspepsia  melatonin 3 milliGRAM(s) Oral at bedtime PRN Insomnia  ondansetron Injectable 4 milliGRAM(s) IV Push every 8 hours PRN Nausea and/or Vomiting      Physical Exam:  Gen: NAD.  Lungs: Non labored breathing.   Ab: Soft, nontender, nondistended. R NT in place  : Castro in place    Labs:    05-16    138  |  104  |  60<H>  ----------------------------<  91  4.2   |  19<L>  |  2.06<H>    Ca    8.6      16 May 2024 07:04        < from: MR Pelvis w/wo IV Cont (05.16.24 @ 09:31) >  ACC: [id number] EXAM:  MR PELVIS WAW IC   ORDERED BY: PAPI ANSARI     ACC: [id number] EXAM:  MR ABDOMEN WAW IC   ORDERED BY: PAPI ANSARI     PROCEDURE DATE:  05/16/2024          INTERPRETATION:  CLINICAL INFORMATION: Ureteral mass. History lymphoma.   BPH. Right hydronephrosis.    COMPARISON: CT abdomen pelvis 5/10/2024.    CONTRAST/COMPLICATIONS:  IV Contrast: Gadavist  6.5 cc administered   1 cc discarded  Oral Contrast: NONE  Complications: None reported at time of study completion    PROCEDURE:  MRI of the abdomen and pelvis was performed as per MR Urogram protocol.      FINDINGS:  LOWER CHEST: Trace right pleural effusion. The right basilar subsegmental   atelectasis.    LIVER: A right hepatic lobe cyst.  BILE DUCTS: Normal caliber.  GALLBLADDER: Distended Biliary sludge.  SPLEEN: Within normal limits.  PANCREAS: Two pancreatic cystic foci measuring up to 0.5 cm.  ADRENALS: Within normal limits.  KIDNEYS/URETERS: Interval placement of the right nephrostomy tube with   pigtail tip terminating within the renal pelvis with interval decrease of   right hydronephrosis. There are few T1 hyperintensity measuring up to 1.6   cm in the right renal pelvis and mid ureter, suggestive of blood clots   associated with nephrostomy procedure.  Distal to the 1.6 cm T1 hyperintensity, there is circumferential mural   thickening and enhancement of the ureter measuring 1.0 cm (54/77).  A nonenhancing complex cystic lesion in the right kidney measuring 6.2 x   5.7 cm with layering T1 hyperintensity in the periphery, suggestive of   layering hematoma. Additional right cortical and peripelvic cysts. Trace   right perinephric edema.    Atrophic left kidney. Altered the left renal simple cysts. No   hydronephrosis.    BLADDER: Collapsed around Castro catheter. Mural thickening and   trabeculation of the bladder. Intraluminal linear T1 hyperintense signal   material measuring 3.0 cm, likely represents combination of   sludge/hematoma.  REPRODUCTIVE ORGANS: Markedly enlarged prostate.    BOWEL: No bowel obstruction. Scattered colonic diverticula. Normal   appendix.  PERITONEUM: No ascites.  VESSELS: Atherosclerotic changes.  RETROPERITONEUM/LYMPH NODES: Multiple enlarged retroperitoneal/pelvic   lymphadenopathy. For reference, a left paraaortic enlarged matted lymph   node measures 5.1 x 3.3 cm (18/47).  ABDOMINAL WALL: Within normal limits.  BONES: 1.4 cm right ischial and 2.2 cm right iliac bone enhancing lesions.    IMPRESSION:  1.  An circumferential enhancement and mural thickening of the right mid   ureter measuring 1.0 cm, suggestive of ureteral lesion. Recommend further   evaluation with direct visualization.  2.  Interval placement of the right nephrostomy tube with decrease in   right hydronephrosis.  3.  Few nonenhancing T1 hyperintensities in the right renal pelvis, mid   ureter, and urinary bladder, suggestive of hematoma associated with   nephrostomy procedure.          --- End of Report ---            TRAVIS CHEUNG MD; Attending Radiologist  This document has been electronically signed. May 16 2024  1:44PM    < end of copied text >        
Urology Progress Note     Subjective/24hour Events:   Patient seen and examined.   No acute events overnight.   Pain controlled.     Vital Signs:  Vital Signs Last 24 Hrs  T(C): 36.8 (14 May 2024 04:59), Max: 37.1 (13 May 2024 20:35)  T(F): 98.2 (14 May 2024 04:59), Max: 98.7 (13 May 2024 20:35)  HR: 79 (14 May 2024 04:59) (79 - 99)  BP: 142/68 (14 May 2024 04:59) (112/67 - 153/73)  BP(mean): --  RR: 18 (14 May 2024 04:59) (18 - 18)  SpO2: 95% (14 May 2024 04:59) (93% - 97%)    Parameters below as of 14 May 2024 04:59  Patient On (Oxygen Delivery Method): room air        CAPILLARY BLOOD GLUCOSE          I&O's Detail    13 May 2024 07:01  -  14 May 2024 07:00  --------------------------------------------------------  IN:    Oral Fluid: 480 mL  Total IN: 480 mL    OUT:    Indwelling Catheter - Urethral (mL): 1020 mL    Nephrostomy Tube (mL): 1250 mL  Total OUT: 2270 mL    Total NET: -1790 mL          MEDICATIONS  (STANDING):  amLODIPine   Tablet 5 milliGRAM(s) Oral daily  atorvastatin 20 milliGRAM(s) Oral at bedtime  metoprolol succinate ER 50 milliGRAM(s) Oral daily  polyethylene glycol 3350 17 Gram(s) Oral <User Schedule>  senna 2 Tablet(s) Oral at bedtime  tamsulosin 0.4 milliGRAM(s) Oral at bedtime    MEDICATIONS  (PRN):  acetaminophen     Tablet .. 650 milliGRAM(s) Oral every 6 hours PRN Temp greater or equal to 38C (100.4F), Mild Pain (1 - 3)  melatonin 3 milliGRAM(s) Oral at bedtime PRN Insomnia  ondansetron Injectable 4 milliGRAM(s) IV Push every 8 hours PRN Nausea and/or Vomiting      Physical Exam:  Gen: NAD.  Lungs: Non labored breathing.   Ab: Soft, nontender, nondistended. R NT in place draining cyu   : Gloria in place draining    Labs:    05-13    136  |  104  |  88<H>  ----------------------------<  82  5.0   |  18<L>  |  4.33<H>    Ca    9.0      13 May 2024 07:01                              15.4   7.02  )-----------( 205      ( 13 May 2024 07:01 )             46.6         
Interventional Radiology Follow-Up Note    This is a 89y Male s/p right percutaneous nephrostomy tube placement on 24 in Interventional Radiology with Dr. Martin.     S: Patient seen and examined @ bedside. No complaints offered.     Medication:  amLODIPine   Tablet: ()  aztreonam  IVPB: ()  heparin   Injectable: ()  metoprolol succinate ER: ()    Vitals:   T(F): 97.6, Max: 98.6 (16:20)  HR: 90  BP: 125/68  RR: 18  SpO2: 94%    Physical Exam:  General: Nontoxic, in NAD, A&O x3.  Abdomen: soft, NTND, no peritoneal signs. right PCN in place. Castro catheter in place with Karen urine.   Drain Device: Drain intact attached to bag with clear yellow fluid in drainage bag. Dressing clean, dry, intact. Drain flushed with 10cc NS w/o issues, +fluid return noted in tubing, no pericatheter leakage.     LABS:  WBC 7.02 / Hgb 15.4 / Hct 46.6 / Plt 205  Na 136 / K 5.0 / CO2 18 / Cl 104 / BUN 88 / Cr 4.33 / Glucose 82  ALT -- / AST -- / Alk Phos -- / Tbili --  Ptt -- / Pt -- / INR --      24hr Drain output: 500cc    Assessment/Plan:  89 year old male w/pmhx HTN, BPH, HLD, basal and squamous cell carcinomas s/p Mohs presents for abdominal pain for the past few days. Patient reports persistent right upper quadrant pain over the past four days,  radiating to the right flank area, associated with nausea but no vomiting , no exacerbating or reliving factors, he also reports bloating and constipation during this time. He reports no fever or chills. Of note , patient had about 10lb unintended weight loss over the past several months. CT with soft tissue right mid/distal ureter w RP adenopathy, moderate right hydroureteronephrosis, and LIA (left kidney is atrophic). patient is s/p right percutaneous nephrostomy tube placement on 24 in Interventional Radiology with Dr. Martin.         -Cr downtrending   -WBC downtrending  -Cx from PCN pending  -Continue to monitor and record drain output  -Encourage PO hydration, IVF if unable to tolerate PO.   -h/h stable  -trend vs/labs  -flush drain with 10cc NS daily forward only; DO NOT aspirate  -change dressing q3 days or when dressing is saturated  -regarding outpatient follow up with IR, if the patient is d/c home with drainage catheter they can make an appointment with IR by calling the IR booking office at [phone]; recommend IR follow in 3 months for tube evaluation/ exchange.  -they will benefit from VNS service to help with drainage catheter care; they should continue same drainage catheter care as an outpatient.  -continue global management per primary team       Any questions or concerns regarding above please reach out to IR:   -Available on [employer] teams  -During working hours (7a-5p): call -[phone]  -Emergent issues after 5pm: page: [phone]  -Non-emergent consults: Please place a [place] order "IR Consult" with an appropriate callback number  -Scheduling questions: [phone]  -Clinic/Outpatient [phone]      Suzanne Quarles PA-C  Available on teams    
Subjective  Resting comfortably.     Objective    Vital signs  T(F): , Max: 97.9 (05-11-24 @ 12:05)  HR: 92 (05-12-24 @ 05:07)  BP: 163/61 (05-12-24 @ 05:07)  SpO2: 94% (05-12-24 @ 05:07)  Wt(kg): --    Output     OUT:    Indwelling Catheter - Urethral (mL): 400 mL  Total OUT: 400 mL    Total NET: -400 mL          Gen: NAD  Abd: soft, nontender, nondistended  : manuel secured in place, draining clear pink urine    Labs      05-12 @ 07:11    WBC 9.71  / Hct 46.9  / SCr 5.88     05-11 @ 07:22    WBC 10.32 / Hct 48.6  / SCr 5.21         Culture - Urine (collected 05-10-24 @ 19:17)  Source: Clean Catch Clean Catch (Midstream)  Final Report (05-11-24 @ 22:33):    <10,000 CFU/mL Normal Urogenital Mayda      
[facility] DIVISION OF KIDNEY DISEASES AND HYPERTENSION   FOLLOW UP NOTE  --------------------------------------------------------------------------------  Chief Complaint: LIA on CKD    24 hour events/subjective: Pt. seen and examined. Pt is feeling well. Pt notes good urine output from PCN and Castro. Denies fevers/chills, HA, CP, SOB, dysuria, or leg swelling.    PAST HISTORY  --------------------------------------------------------------------------------  No significant changes to PMH, PSH, FHx, SHx, unless otherwise noted    ALLERGIES & MEDICATIONS  --------------------------------------------------------------------------------  Allergies  penicillin (Rash)    Intolerances    Standing Inpatient Medications  aluminum hydroxide/magnesium hydroxide/simethicone Suspension 30 milliLiter(s) Oral once  amLODIPine   Tablet 5 milliGRAM(s) Oral daily  atorvastatin 20 milliGRAM(s) Oral at bedtime  heparin   Injectable 5000 Unit(s) SubCutaneous every 12 hours  lactated ringers. 1000 milliLiter(s) IV Continuous <Continuous>  metoprolol succinate ER 50 milliGRAM(s) Oral daily  polyethylene glycol 3350 17 Gram(s) Oral <User Schedule>  senna 2 Tablet(s) Oral at bedtime  tamsulosin 0.4 milliGRAM(s) Oral at bedtime    PRN Inpatient Medications  acetaminophen     Tablet .. 650 milliGRAM(s) Oral every 6 hours PRN  melatonin 3 milliGRAM(s) Oral at bedtime PRN  ondansetron Injectable 4 milliGRAM(s) IV Push every 8 hours PRN    REVIEW OF SYSTEMS  --------------------------------------------------------------------------------  Gen: No fevers/chills  Head/Eyes/Ears: No HA   Respiratory: No dyspnea, cough  CV: No chest pain  GI: No abdominal pain, diarrhea  : +R PCN, +Castro  MSK: No edema  Skin: No rashes  Heme: No easy bruising or bleeding    All other systems were reviewed and are negative, except as noted.    VITALS/PHYSICAL EXAM  --------------------------------------------------------------------------------  T(C): 36.9 (05-16-24 @ 04:29), Max: 36.9 (05-16-24 @ 04:29)  HR: 67 (05-16-24 @ 04:29) (67 - 80)  BP: 136/68 (05-16-24 @ 04:29) (136/68 - 169/70)  RR: 18 (05-16-24 @ 04:29) (18 - 18)  SpO2: 95% (05-16-24 @ 04:29) (95% - 97%)  Wt(kg): --    05-15-24 @ 07:01  -  05-16-24 @ 07:00  --------------------------------------------------------  IN: 240 mL / OUT: 2600 mL / NET: -2360 mL    Physical Exam:  Gen: NAD  HEENT: MMM  Pulm: CTA B/L  CV: S1S2  Abd: Distended but soft  : +R PCN, +Castro catheter  Ext: No B/L LE edema  Neuro: Awake  Skin: Warm and dry, petechial rash in LE B/L    LABS/STUDIES  --------------------------------------------------------------------------------    138  |  104  |  60  ----------------------------<  91      [05-16-24 @ 07:04]  4.2   |  19  |  2.06        Ca     8.6     [05-16-24 @ 07:04]    Uric acid 5.8      [05-15-24 @ 06:48]        [05-15-24 @ 06:48]    Creatinine Trend:  SCr 2.06 [05-16 @ 07:04]  SCr 2.39 [05-15 @ 06:48]  SCr 3.34 [05-14 @ 07:26]  SCr 4.33 [05-13 @ 07:01]  SCr 5.88 [05-12 @ 07:11]
Patient is a 89y old  Male who presents with a chief complaint of abdominal pain x few days (14 May 2024 10:47)    Patient seen and examined at bedside.  MEDICATIONS  (STANDING):  amLODIPine   Tablet 5 milliGRAM(s) Oral daily  atorvastatin 20 milliGRAM(s) Oral at bedtime  metoprolol succinate ER 50 milliGRAM(s) Oral daily  polyethylene glycol 3350 17 Gram(s) Oral <User Schedule>  senna 2 Tablet(s) Oral at bedtime  tamsulosin 0.4 milliGRAM(s) Oral at bedtime    MEDICATIONS  (PRN):  acetaminophen     Tablet .. 650 milliGRAM(s) Oral every 6 hours PRN Temp greater or equal to 38C (100.4F), Mild Pain (1 - 3)  melatonin 3 milliGRAM(s) Oral at bedtime PRN Insomnia  ondansetron Injectable 4 milliGRAM(s) IV Push every 8 hours PRN Nausea and/or Vomiting      Vital Signs Last 24 Hrs  T(C): 36.4 (14 May 2024 12:37), Max: 37.1 (13 May 2024 20:35)  T(F): 97.6 (14 May 2024 12:37), Max: 98.7 (13 May 2024 20:35)  HR: 85 (14 May 2024 12:37) (76 - 99)  BP: 144/72 (14 May 2024 12:37) (112/67 - 153/73)  BP(mean): --  RR: 18 (14 May 2024 12:37) (18 - 18)  SpO2: 95% (14 May 2024 12:37) (93% - 97%)    Parameters below as of 14 May 2024 12:37  Patient On (Oxygen Delivery Method): room air        PE  NAD  Awake, alert  Anicteric, MMM  RRR  CTAB  Abd soft, NT, ND  No c/c/e  No rash grossly  FROM                          15.6   8.85  )-----------( 197      ( 14 May 2024 07:03 )             46.9       05-14    137  |  103  |  86<H>  ----------------------------<  123<H>  4.3   |  18<L>  |  3.34<H>    Ca    8.9      14 May 2024 07:26  Phos  3.5     05-14        
Patient is a 89y old  Male who presents with a chief complaint of abdominal pain x few days (17 May 2024 14:40)    No acute overnight events  Patient seen and examined  OOB in chair    MEDICATIONS  (STANDING):  amLODIPine   Tablet 5 milliGRAM(s) Oral daily  atorvastatin 20 milliGRAM(s) Oral at bedtime  heparin   Injectable 5000 Unit(s) SubCutaneous every 12 hours  lactated ringers. 1000 milliLiter(s) (75 mL/Hr) IV Continuous <Continuous>  metoprolol succinate ER 50 milliGRAM(s) Oral daily  polyethylene glycol 3350 17 Gram(s) Oral <User Schedule>  senna 2 Tablet(s) Oral at bedtime  tamsulosin 0.4 milliGRAM(s) Oral at bedtime    MEDICATIONS  (PRN):  acetaminophen     Tablet .. 650 milliGRAM(s) Oral every 6 hours PRN Temp greater or equal to 38C (100.4F), Mild Pain (1 - 3)  aluminum hydroxide/magnesium hydroxide/simethicone Suspension 30 milliLiter(s) Oral every 6 hours PRN Dyspepsia  melatonin 3 milliGRAM(s) Oral at bedtime PRN Insomnia  ondansetron Injectable 4 milliGRAM(s) IV Push every 8 hours PRN Nausea and/or Vomiting      Vital Signs Last 24 Hrs  T(C): 36.7 (17 May 2024 14:22), Max: 37 (17 May 2024 04:36)  T(F): 98 (17 May 2024 14:22), Max: 98.6 (17 May 2024 04:36)  HR: 71 (17 May 2024 14:22) (71 - 83)  BP: 131/61 (17 May 2024 14:22) (131/61 - 165/74)  BP(mean): --  RR: 17 (17 May 2024 14:22) (17 - 18)  SpO2: 96% (17 May 2024 14:22) (94% - 96%)    Parameters below as of 17 May 2024 14:22  Patient On (Oxygen Delivery Method): room air        PE  Awake, alert  Anicteric, MMM  RRR  CTAB  Abd soft, NT, ND  No c/c    05-17    139  |  105  |  55<H>  ----------------------------<  102<H>  4.1   |  19<L>  |  2.02<H>    Ca    8.8      17 May 2024 06:54      CC: [id number] EXAM:  MR PELVIS WAW IC   ORDERED BY: PAPI ANSARI     ACC: [id number] EXAM:  MR ABDOMEN WAW IC   ORDERED BY: PAPI ANSARI     PROCEDURE DATE:  05/16/2024          INTERPRETATION:  CLINICAL INFORMATION: Ureteral mass. History lymphoma.   BPH. Right hydronephrosis.    COMPARISON: CT abdomen pelvis 5/10/2024.    CONTRAST/COMPLICATIONS:  IV Contrast: Gadavist  6.5 cc administered   1 cc discarded  Oral Contrast: NONE  Complications: None reported at time of study completion    PROCEDURE:  MRI of the abdomen and pelvis was performed as per MR Urogram protocol.      FINDINGS:  LOWER CHEST: Trace right pleural effusion. The right basilar subsegmental   atelectasis.    LIVER: A right hepatic lobe cyst.  BILE DUCTS: Normal caliber.  GALLBLADDER: Distended Biliary sludge.  SPLEEN: Within normal limits.  PANCREAS: Two pancreatic cystic foci measuring up to 0.5 cm.  ADRENALS: Within normal limits.  KIDNEYS/URETERS: Interval placement of the right nephrostomy tube with   pigtail tip terminating within the renal pelvis with interval decrease of   right hydronephrosis. There are few T1 hyperintensity measuring up to 1.6   cm in the right renal pelvis and mid ureter, suggestive of blood clots   associated with nephrostomy procedure.  Distal to the 1.6 cm T1 hyperintensity, there is circumferential mural   thickening and enhancement of the ureter measuring 1.0 cm (54/77).  A nonenhancing complex cystic lesion in the right kidney measuring 6.2 x   5.7 cm with layering T1 hyperintensity in the periphery, suggestive of   layering hematoma. Additional right cortical and peripelvic cysts. Trace   right perinephric edema.    Atrophic left kidney. Altered the left renal simple cysts. No   hydronephrosis.    BLADDER: Collapsed around Castro catheter. Mural thickening and   trabeculation of the bladder. Intraluminal linear T1 hyperintense signal   material measuring 3.0 cm, likely represents combination of   sludge/hematoma.  REPRODUCTIVE ORGANS: Markedly enlarged prostate.    BOWEL: No bowel obstruction. Scattered colonic diverticula. Normal   appendix.  PERITONEUM: No ascites.  VESSELS: Atherosclerotic changes.  RETROPERITONEUM/LYMPH NODES: Multiple enlarged retroperitoneal/pelvic   lymphadenopathy. For reference, a left paraaortic enlarged matted lymph   node measures 5.1 x 3.3 cm (18/47).  ABDOMINAL WALL: Within normal limits.  BONES: 1.4 cm right ischial and 2.2 cm right iliac bone enhancing lesions.    IMPRESSION:  1.  An circumferential enhancement and mural thickening of the right mid   ureter measuring 1.0 cm, suggestive of ureteral lesion. Recommend further   evaluation with direct visualization.  2.  Interval placement of the right nephrostomy tube with decrease in   right hydronephrosis.  3.  Few nonenhancing T1 hyperintensities in the right renal pelvis, mid   ureter, and urinary bladder, suggestive of hematoma associated with   nephrostomy procedure.            
Subjective  Patient seen and examined at bedside. Comfortable.     Objective    Vital signs  T(F): , Max: 98.7 (05-10-24 @ 16:55)  HR: 93 (05-11-24 @ 04:47)  BP: 167/79 (05-11-24 @ 04:47)  SpO2: 95% (05-11-24 @ 04:47)  Wt(kg): --    Output       Gen: NAD  Abd: soft, nontender, nondistended    Labs      05-11 @ 07:22    WBC 10.32 / Hct 48.6  / SCr 5.21     05-10 @ 19:18    WBC --    / Hct --    / SCr 5.00       
Patient is a 89y old  Male who presents with a chief complaint of abdominal pain x few days (17 May 2024 16:13)        SUBJECTIVE / OVERNIGHT EVENTS: patient reports feeling ok.       MEDICATIONS  (STANDING):  amLODIPine   Tablet 5 milliGRAM(s) Oral daily  atorvastatin 20 milliGRAM(s) Oral at bedtime  heparin   Injectable 5000 Unit(s) SubCutaneous every 12 hours  lactated ringers. 1000 milliLiter(s) (75 mL/Hr) IV Continuous <Continuous>  metoprolol succinate ER 50 milliGRAM(s) Oral daily  polyethylene glycol 3350 17 Gram(s) Oral <User Schedule>  senna 2 Tablet(s) Oral at bedtime  tamsulosin 0.4 milliGRAM(s) Oral at bedtime    MEDICATIONS  (PRN):  acetaminophen     Tablet .. 650 milliGRAM(s) Oral every 6 hours PRN Temp greater or equal to 38C (100.4F), Mild Pain (1 - 3)  aluminum hydroxide/magnesium hydroxide/simethicone Suspension 30 milliLiter(s) Oral every 6 hours PRN Dyspepsia  melatonin 3 milliGRAM(s) Oral at bedtime PRN Insomnia  ondansetron Injectable 4 milliGRAM(s) IV Push every 8 hours PRN Nausea and/or Vomiting      Vital Signs Last 24 Hrs  T(C): 36.7 (17 May 2024 14:22), Max: 37 (17 May 2024 04:36)  T(F): 98 (17 May 2024 14:22), Max: 98.6 (17 May 2024 04:36)  HR: 71 (17 May 2024 14:22) (71 - 83)  BP: 131/61 (17 May 2024 14:22) (131/61 - 165/74)  BP(mean): --  RR: 17 (17 May 2024 14:22) (17 - 18)  SpO2: 96% (17 May 2024 14:22) (94% - 96%)    Parameters below as of 17 May 2024 14:22  Patient On (Oxygen Delivery Method): room air      CAPILLARY BLOOD GLUCOSE        I&O's Summary    16 May 2024 07:01  -  17 May 2024 07:00  --------------------------------------------------------  IN: 540 mL / OUT: 1900 mL / NET: -1360 mL    17 May 2024 07:01  -  17 May 2024 19:27  --------------------------------------------------------  IN: 300 mL / OUT: 1625 mL / NET: -1325 mL          PHYSICAL EXAM:   GENERAL: NAD, thin  HEAD:  Atraumatic, Normocephalic  EYES: EOMI, PERRLA, conjunctiva and sclera clear  NECK: Supple,    CHEST/LUNG: Clear to auscultation bilaterally; No wheeze  HEART: S1S2 normal. Regular rate and rhythm; No murmurs, rubs, or gallops  ABDOMEN: Soft, Nontender, Nondistended; Bowel sounds present; right NT and manuel  EXTREMITIES:  2+ Peripheral Pulses, No clubbing, cyanosis, or edema  PSYCH/Neuro: AAOx3. Non-focal.   SKIN: No rashes or lesions      LABS:    05-17    139  |  105  |  55<H>  ----------------------------<  102<H>  4.1   |  19<L>  |  2.02<H>    Ca    8.8      17 May 2024 06:54            Urinalysis Basic - ( 17 May 2024 06:54 )    Color: x / Appearance: x / SG: x / pH: x  Gluc: 102 mg/dL / Ketone: x  / Bili: x / Urobili: x   Blood: x / Protein: x / Nitrite: x   Leuk Esterase: x / RBC: x / WBC x   Sq Epi: x / Non Sq Epi: x / Bacteria: x      < from: MR Pelvis w/wo IV Cont (05.16.24 @ 09:31) >  IMPRESSION:  1.  An circumferential enhancement and mural thickening of the right mid   ureter measuring 1.0 cm, suggestive of ureteral lesion. Recommend further   evaluation with direct visualization.  2.  Interval placement of the right nephrostomy tube with decrease in   right hydronephrosis.  3.  Few nonenhancing T1 hyperintensities in the right renal pelvis, mid   ureter, and urinary bladder, suggestive of hematoma associated with   nephrostomy procedure.    < end of copied text >      RADIOLOGY & ADDITIONAL TESTS:    Imaging Personally Reviewed: mri abd report  Consultant(s) Notes Reviewed:  urology, IR, onco, renal  Care Discussed with Consultants/Other Providers: Uro, IR, onco  
[facility] Division of Kidney Diseases & Hypertension  FOLLOW UP NOTE  [phone]--------------------------------------------------------------------------------    Chief Complaint: LIA on CKD    24 hour events/subjective: Pt. seen and examined at the bedside. Pain has significantly improved. Denies fevers/chills, HA, CP, SOB, dysuria, or leg swelling.    PAST HISTORY  --------------------------------------------------------------------------------  No significant changes to PMH, PSH, FHx, SHx, unless otherwise noted    ALLERGIES & MEDICATIONS  --------------------------------------------------------------------------------    penicillin (Rash)    Intolerances    Standing Inpatient Medications  amLODIPine   Tablet 5 milliGRAM(s) Oral daily  atorvastatin 20 milliGRAM(s) Oral at bedtime  metoprolol succinate ER 50 milliGRAM(s) Oral daily  polyethylene glycol 3350 17 Gram(s) Oral <User Schedule>  senna 2 Tablet(s) Oral at bedtime  tamsulosin 0.4 milliGRAM(s) Oral at bedtime    PRN Inpatient Medications  acetaminophen     Tablet .. 650 milliGRAM(s) Oral every 6 hours PRN  melatonin 3 milliGRAM(s) Oral at bedtime PRN  ondansetron Injectable 4 milliGRAM(s) IV Push every 8 hours PRN    REVIEW OF SYSTEMS  --------------------------------------------------------------------------------  All other systems were reviewed and are negative, except as noted.    VITALS/PHYSICAL EXAM  --------------------------------------------------------------------------------  T(C): 37.1 (05-13-24 @ 20:35), Max: 37.1 (05-13-24 @ 20:35)  HR: 99 (05-13-24 @ 20:35) (85 - 99)  BP: 153/73 (05-13-24 @ 20:35) (125/68 - 153/73)  RR: 18 (05-13-24 @ 20:35) (18 - 18)  SpO2: 93% (05-13-24 @ 20:35) (93% - 97%)  Wt(kg): --  Height (cm): 170.2 (05-12-24 @ 15:29)  Weight (kg): 64 (05-12-24 @ 15:29)  BMI (kg/m2): 22.1 (05-12-24 @ 15:29)  BSA (m2): 1.74 (05-12-24 @ 15:29)    05-12-24 @ 07:01  -  05-13-24 @ 07:00  --------------------------------------------------------  IN: 240 mL / OUT: 1800 mL / NET: -1560 mL    05-13-24 @ 07:01  -  05-13-24 @ 21:34  --------------------------------------------------------  IN: 480 mL / OUT: 1100 mL / NET: -620 mL    Physical Exam:  Gen: NAD  HEENT: MMM  Pulm: CTA B/L  CV: S1S2  Abd: Distended but compressible, tenderness to palpation of R flank.   Ext: No B/L LE edema  Neuro: Awake  Skin: Warm and dry, petechial rash in LE B/L    LABS/STUDIES  --------------------------------------------------------------------------------              15.4   7.02  >-----------<  205      [05-13-24 @ 07:01]              46.6     136  |  104  |  88  ----------------------------<  82      [05-13-24 @ 07:01]  5.0   |  18  |  4.33        Ca     9.0     [05-13-24 @ 07:01]      PT/INR: PT 11.2 , INR 1.07       [05-12-24 @ 07:11]  PTT: 29.5       [05-12-24 @ 07:11]    Uric acid 2.7      [05-12-24 @ 07:11]        [05-12-24 @ 07:11]    Creatinine Trend:  SCr 4.33 [05-13 @ 07:01]  SCr 5.88 [05-12 @ 07:11]  SCr 5.21 [05-11 @ 07:22]  SCr 5.00 [05-10 @ 19:18]  SCr 5.06 [05-10 @ 17:52]    Urinalysis - [05-13-24 @ 07:01]      Color  / Appearance  / SG  / pH       Gluc 82 / Ketone   / Bili  / Urobili        Blood  / Protein  / Leuk Est  / Nitrite       RBC  / WBC  / Hyaline  / Gran  / Sq Epi  / Non Sq Epi  / Bacteria 
Patient is a 89y old  Male who presents with a chief complaint of abdominal pain x few days (18 May 2024 14:40)        SUBJECTIVE / OVERNIGHT EVENTS: patient reports feeling ok.       MEDICATIONS  (STANDING):  amLODIPine   Tablet 5 milliGRAM(s) Oral daily  atorvastatin 20 milliGRAM(s) Oral at bedtime  heparin   Injectable 5000 Unit(s) SubCutaneous every 12 hours  lactated ringers. 1000 milliLiter(s) (75 mL/Hr) IV Continuous <Continuous>  metoprolol succinate ER 50 milliGRAM(s) Oral daily  polyethylene glycol 3350 17 Gram(s) Oral <User Schedule>  senna 2 Tablet(s) Oral at bedtime  tamsulosin 0.4 milliGRAM(s) Oral at bedtime    MEDICATIONS  (PRN):  acetaminophen     Tablet .. 650 milliGRAM(s) Oral every 6 hours PRN Temp greater or equal to 38C (100.4F), Mild Pain (1 - 3)  aluminum hydroxide/magnesium hydroxide/simethicone Suspension 30 milliLiter(s) Oral every 6 hours PRN Dyspepsia  melatonin 3 milliGRAM(s) Oral at bedtime PRN Insomnia  ondansetron Injectable 4 milliGRAM(s) IV Push every 8 hours PRN Nausea and/or Vomiting      Vital Signs Last 24 Hrs  T(C): 36.7 (18 May 2024 15:02), Max: 36.8 (17 May 2024 20:56)  T(F): 98 (18 May 2024 15:02), Max: 98.3 (17 May 2024 20:56)  HR: 77 (18 May 2024 15:02) (71 - 77)  BP: 136/66 (18 May 2024 15:02) (130/76 - 143/65)  BP(mean): --  RR: 18 (18 May 2024 15:02) (18 - 18)  SpO2: 96% (18 May 2024 15:02) (95% - 96%)    Parameters below as of 18 May 2024 15:02  Patient On (Oxygen Delivery Method): room air      CAPILLARY BLOOD GLUCOSE        I&O's Summary    17 May 2024 07:01  -  18 May 2024 07:00  --------------------------------------------------------  IN: 300 mL / OUT: 3125 mL / NET: -2825 mL          PHYSICAL EXAM:   GENERAL: NAD,    HEAD:  Atraumatic, Normocephalic  EYES: EOMI, PERRLA, conjunctiva and sclera clear  NECK: Supple, No JVD  CHEST/LUNG: Clear to auscultation bilaterally; No wheeze  HEART: S1S2 normal. Regular rate and rhythm; No murmurs, rubs, or gallops  ABDOMEN: Soft, Nontender, Nondistended; Bowel sounds present; right NT and Castro.   EXTREMITIES:  2+ Peripheral Pulses, No clubbing, cyanosis, or edema  PSYCH/Neuro: AAOx3. Non-focal.   SKIN: No rashes or lesions      LABS:                        14.7   5.25  )-----------( 230      ( 18 May 2024 07:22 )             44.8     05-18    139  |  106  |  46<H>  ----------------------------<  95  4.4   |  20<L>  |  1.82<H>    Ca    9.1      18 May 2024 07:22  Mg     2.2     05-18            Urinalysis Basic - ( 18 May 2024 07:22 )    Color: x / Appearance: x / SG: x / pH: x  Gluc: 95 mg/dL / Ketone: x  / Bili: x / Urobili: x   Blood: x / Protein: x / Nitrite: x   Leuk Esterase: x / RBC: x / WBC x   Sq Epi: x / Non Sq Epi: x / Bacteria: x      < from: CT Chest No Cont (05.17.24 @ 19:14) >  IMPRESSION:    Mildly enlarged left supraclavicular, left axillary and paraortic lymph   nodes. Nonenlarged mediastinal and right axillary lymph nodes.    No suspicious lung nodule.    < end of copied text >      RADIOLOGY & ADDITIONAL TESTS:    Imaging Personally Reviewed: ct chest report  Consultant(s) Notes Reviewed:    Care Discussed with Consultants/Other Providers:  
Rosey Kirk MD  Hospitalist  Available on MS Teams      PROGRESS NOTE:     Patient is a 89y old  Male who presents with a chief complaint of abdominal pain x few days (11 May 2024 11:47)      SUBJECTIVE / OVERNIGHT EVENTS:  Pt was seen with Croatian  guevara line ID [id number].  Wife at bedside.  Endorsed small amount of bleeding from penis s/p attempted manuel placement yesterday. No fevers/chills, chest pain or palpitations.   Results of CT were reviewed with the patient and wife.    MEDICATIONS  (STANDING):  amLODIPine   Tablet 5 milliGRAM(s) Oral daily  atorvastatin 20 milliGRAM(s) Oral at bedtime  aztreonam  IVPB 1000 milliGRAM(s) IV Intermittent <User Schedule>  metoprolol succinate ER 50 milliGRAM(s) Oral daily  tamsulosin 0.4 milliGRAM(s) Oral at bedtime    MEDICATIONS  (PRN):  acetaminophen     Tablet .. 650 milliGRAM(s) Oral every 6 hours PRN Temp greater or equal to 38C (100.4F), Mild Pain (1 - 3)  melatonin 3 milliGRAM(s) Oral at bedtime PRN Insomnia  ondansetron Injectable 4 milliGRAM(s) IV Push every 8 hours PRN Nausea and/or Vomiting      CAPILLARY BLOOD GLUCOSE      POCT Blood Glucose.: 187 mg/dL (10 May 2024 21:08)  POCT Blood Glucose.: 122 mg/dL (10 May 2024 19:57)    I&O's Summary      PHYSICAL EXAM:  Vital Signs Last 24 Hrs  T(C): 36.4 (11 May 2024 14:50), Max: 37.1 (10 May 2024 16:55)  T(F): 97.5 (11 May 2024 14:50), Max: 98.7 (10 May 2024 16:55)  HR: 88 (11 May 2024 14:50) (87 - 99)  BP: 176/82 (11 May 2024 14:50) (130/68 - 207/87)  BP(mean): 97 (10 May 2024 23:36) (88 - 97)  RR: 18 (11 May 2024 14:50) (17 - 20)  SpO2: 96% (11 May 2024 14:50) (95% - 97%)    Parameters below as of 11 May 2024 14:50  Patient On (Oxygen Delivery Method): room air    CONSTITUTIONAL: Well-developed, well-groomed, in no apparent distress  RESPIRATORY: Breathing comfortably; no dullness to percussion; lungs CTA without wheeze/rhonchi/rales  CARDIOVASCULAR: +S1S2, RRR, no M/G/R; no lower extremity edema  GASTROINTESTINAL: soft, nondistended, tenderness to the right flank/ right quadrants of   PSYCHIATRIC: A+O     LABS:                        16.4   10.32 )-----------( 152      ( 11 May 2024 07:22 )             48.6     05-11    132<L>  |  99  |  80<H>  ----------------------------<  109<H>  4.6   |  13<L>  |  5.21<H>    Ca    9.4      11 May 2024 07:22    TPro  6.7  /  Alb  3.2<L>  /  TBili  1.0  /  DBili  x   /  AST  14  /  ALT  8<L>  /  AlkPhos  71  05-11    PT/INR - ( 11 May 2024 07:22 )   PT: 12.1 sec;   INR: 1.16 ratio         PTT - ( 11 May 2024 07:22 )  PTT:27.8 sec      Urinalysis Basic - ( 11 May 2024 07:22 )    Color: x / Appearance: x / SG: x / pH: x  Gluc: 109 mg/dL / Ketone: x  / Bili: x / Urobili: x   Blood: x / Protein: x / Nitrite: x   Leuk Esterase: x / RBC: x / WBC x   Sq Epi: x / Non Sq Epi: x / Bacteria: x        
Rosey Kirk MD  Hospitalist  Available on MS Teams      PROGRESS NOTE:     Patient is a 89y old  Male who presents with a chief complaint of abdominal pain x few days (12 May 2024 09:08)      SUBJECTIVE / OVERNIGHT EVENTS:  Pt seen with his wife at bedside. He has no acute complaints. States that his abdominal pain is mild, improved since yesterday, responds well to acetaminophen. Last bowel movement was 4 days ago.    MEDICATIONS  (STANDING):  amLODIPine   Tablet 5 milliGRAM(s) Oral daily  atorvastatin 20 milliGRAM(s) Oral at bedtime  aztreonam  IVPB 1000 milliGRAM(s) IV Intermittent <User Schedule>  metoprolol succinate ER 50 milliGRAM(s) Oral daily  tamsulosin 0.4 milliGRAM(s) Oral at bedtime    MEDICATIONS  (PRN):  acetaminophen     Tablet .. 650 milliGRAM(s) Oral every 6 hours PRN Temp greater or equal to 38C (100.4F), Mild Pain (1 - 3)  melatonin 3 milliGRAM(s) Oral at bedtime PRN Insomnia  ondansetron Injectable 4 milliGRAM(s) IV Push every 8 hours PRN Nausea and/or Vomiting      CAPILLARY BLOOD GLUCOSE        I&O's Summary    11 May 2024 07:01  -  12 May 2024 07:00  --------------------------------------------------------  IN: 240 mL / OUT: 400 mL / NET: -160 mL        PHYSICAL EXAM:  Vital Signs Last 24 Hrs  T(C): 36.6 (12 May 2024 05:07), Max: 36.6 (11 May 2024 12:05)  T(F): 97.9 (12 May 2024 05:07), Max: 97.9 (11 May 2024 12:05)  HR: 92 (12 May 2024 05:07) (85 - 97)  BP: 163/61 (12 May 2024 05:07) (137/70 - 176/82)  BP(mean): --  RR: 18 (12 May 2024 05:07) (18 - 18)  SpO2: 94% (12 May 2024 05:07) (94% - 96%)    Parameters below as of 12 May 2024 05:07  Patient On (Oxygen Delivery Method): room air    CONSTITUTIONAL: Well-developed, well-groomed, in no apparent distress  RESPIRATORY: Breathing comfortably; no dullness to percussion; lungs CTA without wheeze/rhonchi/rales  CARDIOVASCULAR: +S1S2, RRR, no M/G/R; no lower extremity edema  : +indwelling manuel in place, urine bag contains yellow urine mixed in with red, some clots  GASTROINTESTINAL: soft, nondistended, mild tenderness to the right flank/ right quadrants of   PSYCHIATRIC: A+O     LABS:                        16.0   9.71  )-----------( 184      ( 12 May 2024 07:11 )             46.9     05-12    134<L>  |  99  |  94<H>  ----------------------------<  117<H>  4.8   |  15<L>  |  5.88<H>    Ca    9.1      12 May 2024 07:11    TPro  6.7  /  Alb  3.2<L>  /  TBili  1.0  /  DBili  x   /  AST  14  /  ALT  8<L>  /  AlkPhos  71  05-11    PT/INR - ( 12 May 2024 07:11 )   PT: 11.2 sec;   INR: 1.07 ratio         PTT - ( 12 May 2024 07:11 )  PTT:29.5 sec      Urinalysis Basic - ( 12 May 2024 07:11 )    Color: x / Appearance: x / SG: x / pH: x  Gluc: 117 mg/dL / Ketone: x  / Bili: x / Urobili: x   Blood: x / Protein: x / Nitrite: x   Leuk Esterase: x / RBC: x / WBC x   Sq Epi: x / Non Sq Epi: x / Bacteria: x        Culture - Urine (collected 10 May 2024 19:17)  Source: Clean Catch Clean Catch (Midstream)  Final Report (11 May 2024 22:33):    <10,000 CFU/mL Normal Urogenital Mayda    
Patient is a 89y old  Male who presents with a chief complaint of abdominal pain x few days (15 May 2024 12:15)        SUBJECTIVE / OVERNIGHT EVENTS: Malian  mel [id number] helped translate. says feels ok. no pain.       MEDICATIONS  (STANDING):  amLODIPine   Tablet 5 milliGRAM(s) Oral daily  atorvastatin 20 milliGRAM(s) Oral at bedtime  heparin   Injectable 5000 Unit(s) SubCutaneous every 12 hours  lactated ringers. 1000 milliLiter(s) (75 mL/Hr) IV Continuous <Continuous>  metoprolol succinate ER 50 milliGRAM(s) Oral daily  polyethylene glycol 3350 17 Gram(s) Oral <User Schedule>  senna 2 Tablet(s) Oral at bedtime  tamsulosin 0.4 milliGRAM(s) Oral at bedtime    MEDICATIONS  (PRN):  acetaminophen     Tablet .. 650 milliGRAM(s) Oral every 6 hours PRN Temp greater or equal to 38C (100.4F), Mild Pain (1 - 3)  melatonin 3 milliGRAM(s) Oral at bedtime PRN Insomnia  ondansetron Injectable 4 milliGRAM(s) IV Push every 8 hours PRN Nausea and/or Vomiting      Vital Signs Last 24 Hrs  T(C): 36.6 (15 May 2024 12:10), Max: 36.7 (15 May 2024 00:02)  T(F): 97.9 (15 May 2024 12:10), Max: 98 (15 May 2024 00:02)  HR: 79 (15 May 2024 12:10) (74 - 84)  BP: 149/72 (15 May 2024 12:10) (144/73 - 153/70)  BP(mean): --  RR: 18 (15 May 2024 12:10) (18 - 18)  SpO2: 95% (15 May 2024 12:10) (95% - 96%)    Parameters below as of 15 May 2024 12:10  Patient On (Oxygen Delivery Method): room air      CAPILLARY BLOOD GLUCOSE      POCT Blood Glucose.: 118 mg/dL (15 May 2024 16:55)    I&O's Summary    14 May 2024 07:01  -  15 May 2024 07:00  --------------------------------------------------------  IN: 75 mL / OUT: 2480 mL / NET: -2405 mL    15 May 2024 07:01  -  15 May 2024 17:30  --------------------------------------------------------  IN: 0 mL / OUT: 500 mL / NET: -500 mL          PHYSICAL EXAM:   GENERAL: NAD, well-developed  HEAD:  Atraumatic, Normocephalic  EYES: EOMI, PERRLA, conjunctiva and sclera clear  NECK: Supple, No JVD  CHEST/LUNG: Clear to auscultation bilaterally; No wheeze  HEART: S1S2 normal. Regular rate and rhythm; No murmurs, rubs, or gallops  ABDOMEN: Soft, Nontender, Nondistended; Bowel sounds present; manuel and right nephrostomy tube slight hematuria.   EXTREMITIES:  No clubbing, cyanosis, or edema  PSYCH/Neuro: AAOx3. Non-focal.   SKIN: scalp old scc lesion site      LABS:                        15.6   8.85  )-----------( 197      ( 14 May 2024 07:03 )             46.9     05-15    140  |  107  |  68<H>  ----------------------------<  110<H>  4.1   |  21<L>  |  2.39<H>    Ca    8.9      15 May 2024 06:48  Phos  3.5     05-14            Urinalysis Basic - ( 15 May 2024 06:48 )    Color: x / Appearance: x / SG: x / pH: x  Gluc: 110 mg/dL / Ketone: x  / Bili: x / Urobili: x   Blood: x / Protein: x / Nitrite: x   Leuk Esterase: x / RBC: x / WBC x   Sq Epi: x / Non Sq Epi: x / Bacteria: x          RADIOLOGY & ADDITIONAL TESTS:    Imaging Personally Reviewed:  Consultant(s) Notes Reviewed:    Care Discussed with Consultants/Other Providers:  
Patient is a 89y old  Male who presents with a chief complaint of abdominal pain x few days (14 May 2024 14:05)        SUBJECTIVE / OVERNIGHT EVENTS: spoke with patient using  Isabel [id number]. Reports no pain. eating light. feet are mildly cold. had a BM today.       MEDICATIONS  (STANDING):  amLODIPine   Tablet 5 milliGRAM(s) Oral daily  atorvastatin 20 milliGRAM(s) Oral at bedtime  metoprolol succinate ER 50 milliGRAM(s) Oral daily  polyethylene glycol 3350 17 Gram(s) Oral <User Schedule>  senna 2 Tablet(s) Oral at bedtime  tamsulosin 0.4 milliGRAM(s) Oral at bedtime    MEDICATIONS  (PRN):  acetaminophen     Tablet .. 650 milliGRAM(s) Oral every 6 hours PRN Temp greater or equal to 38C (100.4F), Mild Pain (1 - 3)  melatonin 3 milliGRAM(s) Oral at bedtime PRN Insomnia  ondansetron Injectable 4 milliGRAM(s) IV Push every 8 hours PRN Nausea and/or Vomiting      Vital Signs Last 24 Hrs  T(C): 36.4 (14 May 2024 12:37), Max: 37.1 (13 May 2024 20:35)  T(F): 97.6 (14 May 2024 12:37), Max: 98.7 (13 May 2024 20:35)  HR: 85 (14 May 2024 12:37) (76 - 99)  BP: 144/72 (14 May 2024 12:37) (112/67 - 153/73)  BP(mean): --  RR: 18 (14 May 2024 12:37) (18 - 18)  SpO2: 95% (14 May 2024 12:37) (93% - 97%)    Parameters below as of 14 May 2024 12:37  Patient On (Oxygen Delivery Method): room air      CAPILLARY BLOOD GLUCOSE        I&O's Summary    13 May 2024 07:01  -  14 May 2024 07:00  --------------------------------------------------------  IN: 480 mL / OUT: 2270 mL / NET: -1790 mL    14 May 2024 07:01  -  14 May 2024 15:52  --------------------------------------------------------  IN: 0 mL / OUT: 400 mL / NET: -400 mL          PHYSICAL EXAM:   GENERAL: NAD,    HEAD:  Atraumatic, Normocephalic  EYES:  conjunctiva and sclera clear  NECK: Supple, old lipoma on right side of lower posterior neck non tender and mobile.   CHEST/LUNG: Clear to auscultation bilaterally; No wheeze  HEART: S1S2 normal. Regular rate and rhythm; No murmurs, rubs, or gallops  ABDOMEN: Soft, Nontender, Nondistended; Bowel sounds present  EXTREMITIES:  No clubbing, cyanosis, or edema  PSYCH/Neuro: AAOx3. Non-focal.       LABS:                        15.6   8.85  )-----------( 197      ( 14 May 2024 07:03 )             46.9     05-14    137  |  103  |  86<H>  ----------------------------<  123<H>  4.3   |  18<L>  |  3.34<H>    Ca    8.9      14 May 2024 07:26  Phos  3.5     05-14            Urinalysis Basic - ( 14 May 2024 07:26 )    Color: x / Appearance: x / SG: x / pH: x  Gluc: 123 mg/dL / Ketone: x  / Bili: x / Urobili: x   Blood: x / Protein: x / Nitrite: x   Leuk Esterase: x / RBC: x / WBC x   Sq Epi: x / Non Sq Epi: x / Bacteria: x          RADIOLOGY & ADDITIONAL TESTS:    Imaging Personally Reviewed:  Consultant(s) Notes Reviewed:  urology, heme/onco  Care Discussed with Consultants/Other Providers:  
Patient is a 89y old  Male who presents with a chief complaint of abdominal pain x few days (16 May 2024 13:15)        SUBJECTIVE / OVERNIGHT EVENTS: spoke with patient using Persian  Jeanette [id number]. he reports some abdominal discomfort and gas with eating today.       MEDICATIONS  (STANDING):  amLODIPine   Tablet 5 milliGRAM(s) Oral daily  atorvastatin 20 milliGRAM(s) Oral at bedtime  heparin   Injectable 5000 Unit(s) SubCutaneous every 12 hours  lactated ringers. 1000 milliLiter(s) (75 mL/Hr) IV Continuous <Continuous>  metoprolol succinate ER 50 milliGRAM(s) Oral daily  polyethylene glycol 3350 17 Gram(s) Oral <User Schedule>  senna 2 Tablet(s) Oral at bedtime  tamsulosin 0.4 milliGRAM(s) Oral at bedtime    MEDICATIONS  (PRN):  acetaminophen     Tablet .. 650 milliGRAM(s) Oral every 6 hours PRN Temp greater or equal to 38C (100.4F), Mild Pain (1 - 3)  melatonin 3 milliGRAM(s) Oral at bedtime PRN Insomnia  ondansetron Injectable 4 milliGRAM(s) IV Push every 8 hours PRN Nausea and/or Vomiting      Vital Signs Last 24 Hrs  T(C): 36.4 (16 May 2024 14:39), Max: 36.9 (16 May 2024 04:29)  T(F): 97.5 (16 May 2024 14:39), Max: 98.4 (16 May 2024 04:29)  HR: 85 (16 May 2024 14:39) (67 - 85)  BP: 120/64 (16 May 2024 14:39) (120/64 - 169/70)  BP(mean): --  RR: 19 (16 May 2024 14:39) (18 - 19)  SpO2: 95% (16 May 2024 14:39) (95% - 97%)    Parameters below as of 16 May 2024 14:39  Patient On (Oxygen Delivery Method): room air      CAPILLARY BLOOD GLUCOSE        I&O's Summary    15 May 2024 07:01  -  16 May 2024 07:00  --------------------------------------------------------  IN: 240 mL / OUT: 2600 mL / NET: -2360 mL    16 May 2024 07:01  -  16 May 2024 18:01  --------------------------------------------------------  IN: 300 mL / OUT: 500 mL / NET: -200 mL          PHYSICAL EXAM:   GENERAL: NAD, well-developed  HEAD:  Atraumatic, Normocephalic  EYES: EOMI, PERRLA, conjunctiva and sclera clear  NECK: Supple, No JVD  CHEST/LUNG: Clear to auscultation bilaterally; No wheeze  HEART: S1S2 normal. Regular rate and rhythm; No murmurs, rubs, or gallops  ABDOMEN: Soft, mild upper tender, Nondistended; Bowel sounds present; right NT and Castro.   EXTREMITIES:  2+ Peripheral Pulses, No clubbing, cyanosis, or edema  PSYCH/Neuro: AAOx3. Non-focal.   SKIN: old known scalp lesion.       LABS:    05-16    138  |  104  |  60<H>  ----------------------------<  91  4.2   |  19<L>  |  2.06<H>    Ca    8.6      16 May 2024 07:04            Urinalysis Basic - ( 16 May 2024 07:04 )    Color: x / Appearance: x / SG: x / pH: x  Gluc: 91 mg/dL / Ketone: x  / Bili: x / Urobili: x   Blood: x / Protein: x / Nitrite: x   Leuk Esterase: x / RBC: x / WBC x   Sq Epi: x / Non Sq Epi: x / Bacteria: x      < from: MR Pelvis w/wo IV Cont (05.16.24 @ 09:31) >  IMPRESSION:  1.  An circumferential enhancement and mural thickening of the right mid   ureter measuring 1.0 cm, suggestive of ureteral lesion. Recommend further   evaluation with direct visualization.  2.  Interval placement of the right nephrostomy tube with decrease in   right hydronephrosis.  3.  Few nonenhancing T1 hyperintensities in the right renal pelvis, mid   ureter, and urinary bladder, suggestive of hematoma associated with   nephrostomy procedure.    < end of copied text >        RADIOLOGY & ADDITIONAL TESTS:    Imaging Personally Reviewed: mri abdomen/pelvis report.   Consultant(s) Notes Reviewed:  Renal  Care Discussed with Consultants/Other Providers:  
Patient is a 89y old  Male who presents with a chief complaint of abdominal pain x few days (13 May 2024 13:33)        SUBJECTIVE / OVERNIGHT EVENTS: spoke with patient using Danish  - Jeanette [id number]. he says feels better compared to last day or so. reports constipation, but no pain or n/v.       MEDICATIONS  (STANDING):  amLODIPine   Tablet 5 milliGRAM(s) Oral daily  atorvastatin 20 milliGRAM(s) Oral at bedtime  bisacodyl 5 milliGRAM(s) Oral once  metoprolol succinate ER 50 milliGRAM(s) Oral daily  polyethylene glycol 3350 17 Gram(s) Oral <User Schedule>  senna 2 Tablet(s) Oral at bedtime  tamsulosin 0.4 milliGRAM(s) Oral at bedtime    MEDICATIONS  (PRN):  acetaminophen     Tablet .. 650 milliGRAM(s) Oral every 6 hours PRN Temp greater or equal to 38C (100.4F), Mild Pain (1 - 3)  melatonin 3 milliGRAM(s) Oral at bedtime PRN Insomnia  ondansetron Injectable 4 milliGRAM(s) IV Push every 8 hours PRN Nausea and/or Vomiting      Vital Signs Last 24 Hrs  T(C): 36.8 (13 May 2024 16:30), Max: 36.8 (13 May 2024 16:30)  T(F): 98.2 (13 May 2024 16:30), Max: 98.2 (13 May 2024 16:30)  HR: 86 (13 May 2024 16:30) (70 - 90)  BP: 144/72 (13 May 2024 16:30) (125/68 - 152/76)  BP(mean): 83 (12 May 2024 18:00) (83 - 96)  RR: 18 (13 May 2024 16:30) (14 - 19)  SpO2: 97% (13 May 2024 16:30) (94% - 97%)    Parameters below as of 13 May 2024 16:30  Patient On (Oxygen Delivery Method): room air      CAPILLARY BLOOD GLUCOSE        I&O's Summary    12 May 2024 07:01  -  13 May 2024 07:00  --------------------------------------------------------  IN: 240 mL / OUT: 1800 mL / NET: -1560 mL    13 May 2024 07:01  -  13 May 2024 17:09  --------------------------------------------------------  IN: 480 mL / OUT: 900 mL / NET: -420 mL          PHYSICAL EXAM:   GENERAL: NAD, well-developed  HEAD:  Atraumatic, Normocephalic  EYES: EOMI, PERRLA, conjunctiva and sclera clear  NECK: Supple,   CHEST/LUNG: Clear to auscultation bilaterally; No wheeze  HEART: S1S2 normal. Regular rate and rhythm; No murmurs, rubs, or gallops  ABDOMEN: Soft, Nontender, Nondistended; Bowel sounds present; right PCN with blood tinged urine.   EXTREMITIES:    No clubbing, cyanosis, or edema  PSYCH/Neuro: AAOx3. Non-focal.   SKIN: No rashes or lesions      LABS:                        15.4   7.02  )-----------( 205      ( 13 May 2024 07:01 )             46.6     05-13    136  |  104  |  88<H>  ----------------------------<  82  5.0   |  18<L>  |  4.33<H>    Ca    9.0      13 May 2024 07:01      PT/INR - ( 12 May 2024 07:11 )   PT: 11.2 sec;   INR: 1.07 ratio         PTT - ( 12 May 2024 07:11 )  PTT:29.5 sec      Urinalysis Basic - ( 13 May 2024 07:01 )    Color: x / Appearance: x / SG: x / pH: x  Gluc: 82 mg/dL / Ketone: x  / Bili: x / Urobili: x   Blood: x / Protein: x / Nitrite: x   Leuk Esterase: x / RBC: x / WBC x   Sq Epi: x / Non Sq Epi: x / Bacteria: x      < from: CT Abdomen and Pelvis No Cont (05.10.24 @ 19:28) >  IMPRESSION:  Soft tissue filling defect in the right mid/distal ureter, measuring 1.5   cm, possibly neoplasm. Resultant moderate right hydroureteronephrosis.    Significant retroperitoneal lymphadenopathy, which is concerning for a   lymphoproliferative process such as lymphoma.    < end of copied text >  < from: US Abdomen Complete (05.11.24 @ 09:58) >  IMPRESSION:  Cholelithiasis without sonographic evidence of acute cholecystitis.   Normal caliber biliary tree.    Moderate right hydroureteronephrosis, as on recent CT. No left   hydronephrosis. Bilateral renal cysts.    Moderately distended urinary bladder containing debris. Correlate with   urinalysis to assess for cystitis.    Marked prostatomegaly with a volume of 120 cc.    --- End of Report ---    < end of copied text >      RADIOLOGY & ADDITIONAL TESTS:    Imaging Personally Reviewed: ct a/p and us abd  Consultant(s) Notes Reviewed:  heme/onco, urology. IR  Care Discussed with Consultants/Other Providers:

## 2024-05-18 NOTE — PROGRESS NOTE ADULT - PROBLEM SELECTOR PROBLEM 7
Need for prophylactic measure
Hypertension
Need for prophylactic measure

## 2024-05-18 NOTE — PROGRESS NOTE ADULT - PROBLEM SELECTOR PROBLEM 5
Constipation
Leukocytosis (leucocytosis)

## 2024-05-18 NOTE — DISCHARGE NOTE NURSING/CASE MANAGEMENT/SOCIAL WORK - PATIENT PORTAL LINK FT
You can access the FollowMyHealth Patient Portal offered by [facility] by registering at the following website: [email or web address]. By joining [facility]’s FollowMyHealth portal, you will also be able to view your health information using other applications (apps) compatible with our system.

## 2024-05-18 NOTE — PROGRESS NOTE ADULT - REASON FOR ADMISSION
abdominal pain x few days

## 2024-05-18 NOTE — PROGRESS NOTE ADULT - PROBLEM SELECTOR PROBLEM 2
Hydronephrosis, right

## 2024-05-18 NOTE — PROGRESS NOTE ADULT - PROBLEM SELECTOR PLAN 5
constipated x4  days  - start miralax and senna
constipated x4+ days - resolved 5/14.   - c/w miralax and senna
constipated x4+ days - resolved 5/14.   - c/w miralax and senna  -maalox prn for dyspepsia.
constipated x4+ days - resolved 5/14.   - c/w miralax and senna  -maalox prn for dyspepsia.
constipated x4  days  - c/w miralax and senna
constipated x4+ days - resolved 5/14.   - c/w miralax and senna
constipated x4+ days - resolved 5/14.   - c/w miralax and senna  -maalox prn for dyspepsia.
leukocytosis and erythrocytosis , platelets wnl : low suspicion for infection , UA negative , afebrile , not tachycardic , CT AP w/ no findings suggestive of infection , reports abdominal pain,  otherwise no localizing symptoms s/p cipro/flagy  - continuing abx w/ aztreonam   - monitor cell counts  - Heme/onc consult

## 2024-05-18 NOTE — DISCHARGE NOTE NURSING/CASE MANAGEMENT/SOCIAL WORK - NSDCFUADDAPPT_GEN_ALL_CORE_FT
Please schedule an appointment with IR in 3 months for a routine exchange of your right nephrostomy tube or sooner if instructed by your urologist/ nephrologist. You may call the IR booking office @ [phone] to schedule. Please feel free to contact us at [phone] if any problems arise. After 6PM, Monday through Friday, on weekends and on holidays, please call [phone] and ask for the radiology resident on call to be paged.

## 2024-05-18 NOTE — PROGRESS NOTE ADULT - PROBLEM SELECTOR PROBLEM 3
BPH with urinary obstruction
BPH with urinary obstruction
Hyperkalemia
BPH with urinary obstruction

## 2024-05-22 PROBLEM — I10 ESSENTIAL (PRIMARY) HYPERTENSION: Chronic | Status: ACTIVE | Noted: 2024-05-10

## 2024-05-22 PROBLEM — Z00.00 ENCOUNTER FOR PREVENTIVE HEALTH EXAMINATION: Status: ACTIVE | Noted: 2024-05-22

## 2024-05-22 PROBLEM — E78.5 HYPERLIPIDEMIA, UNSPECIFIED: Chronic | Status: ACTIVE | Noted: 2024-05-10

## 2024-06-05 ENCOUNTER — APPOINTMENT (OUTPATIENT)
Dept: UROLOGY | Facility: CLINIC | Age: 89
End: 2024-06-05
Payer: MEDICARE

## 2024-06-05 VITALS
BODY MASS INDEX: 21.97 KG/M2 | HEART RATE: 74 BPM | RESPIRATION RATE: 16 BRPM | HEIGHT: 67 IN | TEMPERATURE: 98.2 F | DIASTOLIC BLOOD PRESSURE: 65 MMHG | SYSTOLIC BLOOD PRESSURE: 138 MMHG | WEIGHT: 140 LBS

## 2024-06-05 DIAGNOSIS — R33.9 RETENTION OF URINE, UNSPECIFIED: ICD-10-CM

## 2024-06-05 PROCEDURE — 99213 OFFICE O/P EST LOW 20 MIN: CPT

## 2024-06-06 ENCOUNTER — APPOINTMENT (OUTPATIENT)
Dept: NEPHROLOGY | Facility: CLINIC | Age: 89
End: 2024-06-06
Payer: MEDICARE

## 2024-06-06 VITALS
BODY MASS INDEX: 21.8 KG/M2 | SYSTOLIC BLOOD PRESSURE: 153 MMHG | HEIGHT: 67 IN | DIASTOLIC BLOOD PRESSURE: 70 MMHG | TEMPERATURE: 98.3 F | HEART RATE: 82 BPM | WEIGHT: 138.89 LBS | OXYGEN SATURATION: 96 %

## 2024-06-06 VITALS — DIASTOLIC BLOOD PRESSURE: 60 MMHG | SYSTOLIC BLOOD PRESSURE: 124 MMHG

## 2024-06-06 DIAGNOSIS — E78.5 HYPERLIPIDEMIA, UNSPECIFIED: ICD-10-CM

## 2024-06-06 DIAGNOSIS — I10 ESSENTIAL (PRIMARY) HYPERTENSION: ICD-10-CM

## 2024-06-06 DIAGNOSIS — Z80.3 FAMILY HISTORY OF MALIGNANT NEOPLASM OF BREAST: ICD-10-CM

## 2024-06-06 DIAGNOSIS — N18.9 ACUTE KIDNEY FAILURE, UNSPECIFIED: ICD-10-CM

## 2024-06-06 DIAGNOSIS — Z85.72 PERSONAL HISTORY OF NON-HODGKIN LYMPHOMAS: ICD-10-CM

## 2024-06-06 DIAGNOSIS — E87.5 HYPERKALEMIA: ICD-10-CM

## 2024-06-06 DIAGNOSIS — N17.9 ACUTE KIDNEY FAILURE, UNSPECIFIED: ICD-10-CM

## 2024-06-06 DIAGNOSIS — N13.9 OBSTRUCTIVE AND REFLUX UROPATHY, UNSPECIFIED: ICD-10-CM

## 2024-06-06 DIAGNOSIS — N13.30 UNSPECIFIED HYDRONEPHROSIS: ICD-10-CM

## 2024-06-06 DIAGNOSIS — E87.20 ACIDOSIS, UNSPECIFIED: ICD-10-CM

## 2024-06-06 DIAGNOSIS — Z78.9 OTHER SPECIFIED HEALTH STATUS: ICD-10-CM

## 2024-06-06 PROCEDURE — 99214 OFFICE O/P EST MOD 30 MIN: CPT

## 2024-06-06 RX ORDER — METOPROLOL SUCCINATE 50 MG/1
50 TABLET, EXTENDED RELEASE ORAL
Qty: 30 | Refills: 0 | Status: ACTIVE | COMMUNITY
Start: 2024-06-06

## 2024-06-06 RX ORDER — SENNOSIDES 8.6 MG TABLETS 8.6 MG/1
8.6 TABLET ORAL
Refills: 0 | Status: ACTIVE | COMMUNITY
Start: 2024-06-06

## 2024-06-06 RX ORDER — LORATADINE 5 MG
17 TABLET,CHEWABLE ORAL DAILY
Refills: 0 | Status: ACTIVE | COMMUNITY
Start: 2024-06-06

## 2024-06-06 RX ORDER — ATORVASTATIN CALCIUM 20 MG/1
20 TABLET, FILM COATED ORAL
Qty: 90 | Refills: 3 | Status: ACTIVE | COMMUNITY
Start: 2024-06-06

## 2024-06-06 RX ORDER — BIMATOPROST 0.1 MG/ML
0.01 SOLUTION/ DROPS OPHTHALMIC DAILY
Refills: 0 | Status: ACTIVE | COMMUNITY
Start: 2024-06-06

## 2024-06-06 RX ORDER — TAMSULOSIN HYDROCHLORIDE 0.4 MG/1
0.4 CAPSULE ORAL
Qty: 30 | Refills: 0 | Status: ACTIVE | COMMUNITY
Start: 2024-06-06

## 2024-06-06 RX ORDER — AMLODIPINE BESYLATE 5 MG/1
5 TABLET ORAL
Qty: 30 | Refills: 11 | Status: ACTIVE | COMMUNITY
Start: 2024-06-06

## 2024-06-06 NOTE — PHYSICAL EXAM
[General Appearance - Alert] : alert [General Appearance - In No Acute Distress] : in no acute distress [General Appearance - Well Nourished] : well nourished [Sclera] : the sclera and conjunctiva were normal [Outer Ear] : the ears and nose were normal in appearance [Neck Appearance] : the appearance of the neck was normal [Jugular Venous Distention Increased] : there was no jugular-venous distention [Respiration, Rhythm And Depth] : normal respiratory rhythm and effort [Auscultation Breath Sounds / Voice Sounds] : lungs were clear to auscultation bilaterally [Heart Rate And Rhythm] : heart rate was normal and rhythm regular [Heart Sounds] : normal S1 and S2 [Heart Sounds Gallop] : no gallops [Murmurs] : no murmurs [Heart Sounds Pericardial Friction Rub] : no pericardial rub [Edema] : there was no peripheral edema [Bowel Sounds] : normal bowel sounds [Abdomen Soft] : soft [FreeTextEntry1] : Right PCN in place [Involuntary Movements] : no involuntary movements were seen [] : no rash [No Focal Deficits] : no focal deficits [Oriented To Time, Place, And Person] : oriented to person, place, and time [Impaired Insight] : insight and judgment were intact [Affect] : the affect was normal

## 2024-06-06 NOTE — HISTORY OF PRESENT ILLNESS
[FreeTextEntry1] : SAL GUTIÉRREZ is a 89 year male with a history of HTN, HLD, CKD, BPH and SCC. He presented to [facility] on May 10th with c/o flank pain and Nephrology was consulted for LIA on CKD, hyperkalemia, and metabolic acidosis. Discharged on 5/17. Here today for post hospitalization follow up with his sister whom he lives with. Vietnamese speaking but understand a good amount of English. Sister translates at times.  No labs available prior to this admission on [place] HIE. As per pt. his Scr normally ranges between 1.7 to 2. Scr on admission was elevated at 5.06 (5/10). Serum potassium elevated at 5.6. SCO2 low at 16.  As per pt, he has had a kidney US in the past (>10 years ago) and at that time was normal. CT abd/pelv on admission to [facility] showed soft tissue filling defect in the right mid/distal ureter, measuring 1.5 cm, possibly neoplasm, resultant moderate right hydroureteronephrosis, and significant retroperitoneal mass. PCN on 5/12 and with that serum creatinine improved. He was seen by Dr. Yun yesterday for HFU, biopsy next week.  Dr. Trujillo as well and scheduled for PET scan next week.  Castro was removed yesterday and now voiding without difficulty. Right PCN remains in place and draining clear yellow urine. [facility] and PT in place.  Dr. Yun - urologist Dr. Trujillo- oncologist  Mom- breast cancer Ex-smoker social alcohol

## 2024-06-06 NOTE — RESULTS/DATA
[TextEntry] : LABS/STUDIES --------------------------------------------------------------------------------             17.7  15.09 >-----------<  174      [05-10-24 @ 17:52]             53.5   132  |  96  |  78 ----------------------------<  130      [05-10-24 @ 17:52] 5.6   |  16  |  5.06      Ca     9.7     [05-10-24 @ 17:52]  TPro  7.9  /  Alb  3.7  /  TBili  1.0  /  DBili  x   /  AST  17  /  ALT  12  /  AlkPhos  83  [05-10-24 @ 17:52]  Creatinine Trend: SCr 2.02 [05-17 @ 06:54] SCr 2.06 [05-16 @ 07:04] SCr 2.39 [05-15 @ 06:48] SCr 3.34 [05-14 @ 07:26] SCr 4.33 [05-13 @ 07:01] SCr 5.06 [05-10 @ 17:52]

## 2024-06-06 NOTE — ASSESSMENT
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   LIA on CKD: Pt. 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 Labs were done by oncologist Dr. Trujillo yesterday. Will request labs to establish new creatinine baseline.  Castro catheter dc'd by Dr. Yun yesterday. Right PCN remains.   HTN: BP at goal. Continue Amlodipine 5mg and Metoprolol 50mg daily.   Hyperkalemia: Checking labs done yesterday. K+ was controlled on discharge.  Volume status: No edema. Lungs clear.  Metabolic Acidosis: checking labs done yesterday.  Avoid NSAIDs.  Maintain adequate hydration.  Continue f/u with Dr. Yun and Dr. Trujillo Follow up with Dr. Riojas in 2 months

## 2024-06-06 NOTE — REASON FOR VISIT
[Post Hospitalization] : a post hospitalization visit [Other: _____] : [unfilled] [FreeTextEntry1] : LIA on CKD, hyperkalemia, and metabolic acidosis

## 2024-06-10 PROBLEM — R33.9 URINARY RETENTION: Status: ACTIVE | Noted: 2024-06-10

## 2024-06-10 NOTE — ASSESSMENT
[FreeTextEntry1] : had spoken to his outside oncologist before si visit - plan for biopsy of one of these nodes. 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 plan for ureteroscopy biopsy and possible laser ablation depending on findings would internalize JJ stent and get rid of the NT patient understands plan

## 2024-06-10 NOTE — HISTORY OF PRESENT ILLNESS
[FreeTextEntry1] : seen as consult in house for right hydronephrosis with a ureteral tumor and ARF noted to have abdominal pain for reason to ER; Creatinine 5 and CT noted above in addition to significant retroperitoneal lymphadenopathy on the LEFT side. has h/o untreated lymphoma. had NT placed bt IR with imrpoved renal function. never had hematuria and no prior TCCs; never smoked

## 2024-06-12 ENCOUNTER — OUTPATIENT (OUTPATIENT)
Dept: OUTPATIENT SERVICES | Facility: HOSPITAL | Age: 89
LOS: 1 days | End: 2024-06-12
Payer: COMMERCIAL

## 2024-06-12 ENCOUNTER — APPOINTMENT (OUTPATIENT)
Dept: CT IMAGING | Facility: HOSPITAL | Age: 89
End: 2024-06-12

## 2024-06-12 ENCOUNTER — RESULT REVIEW (OUTPATIENT)
Age: 89
End: 2024-06-12

## 2024-06-12 DIAGNOSIS — Z00.00 ENCOUNTER FOR GENERAL ADULT MEDICAL EXAMINATION WITHOUT ABNORMAL FINDINGS: ICD-10-CM

## 2024-06-12 DIAGNOSIS — Z98.890 OTHER SPECIFIED POSTPROCEDURAL STATES: Chronic | ICD-10-CM

## 2024-06-12 PROCEDURE — 88341 IMHCHEM/IMCYTCHM EA ADD ANTB: CPT | Mod: 26,59

## 2024-06-12 PROCEDURE — 88307 TISSUE EXAM BY PATHOLOGIST: CPT | Mod: 26

## 2024-06-12 PROCEDURE — 88271 CYTOGENETICS DNA PROBE: CPT

## 2024-06-12 PROCEDURE — 88273 CYTOGENETICS 10-30: CPT

## 2024-06-12 PROCEDURE — 88342 IMHCHEM/IMCYTCHM 1ST ANTB: CPT

## 2024-06-12 PROCEDURE — 88342 IMHCHEM/IMCYTCHM 1ST ANTB: CPT | Mod: 26,59

## 2024-06-12 PROCEDURE — 88360 TUMOR IMMUNOHISTOCHEM/MANUAL: CPT

## 2024-06-12 PROCEDURE — 88291 CYTO/MOLECULAR REPORT: CPT

## 2024-06-12 PROCEDURE — 88307 TISSUE EXAM BY PATHOLOGIST: CPT

## 2024-06-12 PROCEDURE — 77012 CT SCAN FOR NEEDLE BIOPSY: CPT | Mod: 26

## 2024-06-12 PROCEDURE — 88341 IMHCHEM/IMCYTCHM EA ADD ANTB: CPT

## 2024-06-12 PROCEDURE — 38505 NEEDLE BIOPSY LYMPH NODES: CPT | Mod: LT

## 2024-06-12 PROCEDURE — 77012 CT SCAN FOR NEEDLE BIOPSY: CPT

## 2024-06-12 PROCEDURE — 88360 TUMOR IMMUNOHISTOCHEM/MANUAL: CPT | Mod: 26

## 2024-06-12 PROCEDURE — 38505 NEEDLE BIOPSY LYMPH NODES: CPT

## 2024-06-12 PROCEDURE — 88185 FLOWCYTOMETRY/TC ADD-ON: CPT

## 2024-06-12 PROCEDURE — 87205 SMEAR GRAM STAIN: CPT

## 2024-06-12 PROCEDURE — 88184 FLOWCYTOMETRY/ TC 1 MARKER: CPT

## 2024-06-12 PROCEDURE — 88275 CYTOGENETICS 100-300: CPT

## 2024-06-14 LAB — TM INTERPRETATION: SIGNIFICANT CHANGE UP

## 2024-06-21 ENCOUNTER — APPOINTMENT (OUTPATIENT)
Dept: UROLOGY | Facility: CLINIC | Age: 89
End: 2024-06-21
Payer: MEDICARE

## 2024-06-21 DIAGNOSIS — Z93.6 OTHER ARTIFICIAL OPENINGS OF URINARY TRACT STATUS: ICD-10-CM

## 2024-06-21 LAB — NON-GYNECOLOGICAL CYTOLOGY STUDY: SIGNIFICANT CHANGE UP

## 2024-06-21 PROCEDURE — 99212 OFFICE O/P EST SF 10 MIN: CPT

## 2024-06-21 PROCEDURE — G2211 COMPLEX E/M VISIT ADD ON: CPT

## 2024-06-22 NOTE — HISTORY OF PRESENT ILLNESS
[FreeTextEntry1] : seen as consult in house for right hydronephrosis with a ureteral tumor and ARF noted to have abdominal pain for reason to ER; Creatinine 5 and CT noted above in addition to significant retroperitoneal lymphadenopathy on the LEFT side. has h/o untreated lymphoma. had NT placed bt IR with imrpoved renal function. never had hematuria and no prior TCCs; never smoked   6/21/24 - Returns for f/u s/p placement of right NT in hospital. Denies hematuria or flank pain. Right NT site without surrounding erythema. Informs that he experiences small amount of penile urination as well.  Family member flushes NT daily with 10cc sterile water. Is requesting more supplies

## 2024-06-22 NOTE — ASSESSMENT
[FreeTextEntry1] : Discussed referral to [address] [place] [address] for home delivery of nephrostomy supplies going forward Re-enforced proper sterile technique for NT flushes

## 2024-06-22 NOTE — ASSESSMENT
"PSYCHIATRIC EVALUATION      PATIENT: Harjeet Orozco RECORD NUMBER: [id number]  YOB: 1983   AGE: 42year old    DATE: 5/17/2022   TIME: 12:38 PM     REQUESTING PROVIDER:  Erick Siu MD    Tele psychiatry video interview done from Home with patient located at [address] [facility]. IDENTIFICATION:  John Kaur is a 42year old who is single, [place] American male. REASON FOR HOSPITALIZATION:  Patient with above identification information presents to the ED for evaluation of suicidal ideation, suicide attempt, and drug abuse. The patient reports that he has had ongoing and recurrent problems with cocaine abuse, coupled with depression and feelings of hopelessness over not being able to overcome this addiction. He reports that he was at [facility] several days ago for [facility] day"", and then was at [facility] for \""three days\"" for detox having been discharged earlier today. Within hours of discharge, he admits to relapsing with cocaine abuse. He then began feeling increasingly hopeless and reports that he overdosed on âall of the trazodone they sent me home with and 8 or 9 R of the mirtazapine pills. He then called 911 himself and was brought to our emergency department. Patient was initially accepted to [facility] however had to be admitted medically due to positive COVID test.  Per review of chart patient patient left AMA from [facility] on 5/13/2022. Per Dr. Colonel Crawford documentation, \""patient request to discharge AMA. The reason why he wants to leave todayÂ isÂ because he does not want to be isolated in hisÂ room since he isÂ COVID positive. Â He denies symptoms such as chest pain and shortness of breath. Â Â Â He promises to seek help if he starts to have active suicidal thoughts. Â At the current moment, he denies intentions of taking his life. Â Â Per chart review, patient was admitted to [facility] for similar reasons noted aboveÂ last month.  Â During that hospital stay, he was disrespectful towards staff and ended up " [FreeTextEntry1] : Discussed referral to [address] [place] [address] for home delivery of nephrostomy supplies going forward Re-enforced proper sterile technique for NT flushes  "leaving AMA. Â ""  Patient had a care plan in place formulated in April of 2022 \""Prior to future admissions, patient should have housing secured or be willing to sign ROIs for aftercare treatment for staff to be able to assist in [address]. Patient should be aware of expectations regarding how staff should be respected during stay. \""  He has had numerous presentations to the ER with similar complaints. CHIEF COMPLAINT:  Psychiatry was asked to evaluate the patient for SI and cocaine abuse. HISTORY OF PRESENTING ILLNESS:  Chart reviewed interval history noted. Met with patient using tele video equipment. Patient not very cooperative with interview questioning. Not making eye contact. Visibly getting upset. Upon questioning, stated that he is currently homeless. Recognizes that he will not be kept in the hospital for long. Expressed that he would like to get into residential AODA treatment. States he has not made any calls to impact. Was not disclosing where he was living prior to current hospitalization. States he struggles with chronic suicidal thoughts however would not mention what made him overdose yesterday. States he had been homeless for a long time. Would not answer questions related to safety assessment. Reported feeling tired. Does not believe he is going through cocaine withdrawal stating âthere are no withdrawal from cocaine and coat. States he is tired because he overdosed on his medications. REVIEW OF SYSTEMS:  Please refer to the hospitalist service note for further assessment of this portion of database.      ALLERGIES:   Allergen Reactions   â¢ Lactose Intolerance   (Food Or Med) DIARRHEA   â¢ Penicillins Other (See Comments)     Unsure of reaction       Current Facility-Administered Medications   Medication   â¢ sodium chloride (NORMAL SALINE) 0.9 % bolus 500 mL   â¢ enoxaparin (LOVENOX) injection 40 mg   â¢ ondansetron (ZOFRAN ODT) disintegrating tablet 4 mg    Or   â¢ ondansetron " "(ZOFRAN) injection 4 mg   â¢ cloNIDine (CATAPRES) tablet 0.1 mg   â¢ ibuprofen (MOTRIN) tablet 600 mg   â¢ loperamide (IMODIUM) capsule 2 mg   â¢ sodium chloride 0.9 % flush bag 25 mL   â¢ sodium chloride (PF) 0.9 % injection 2 mL       VITAL SIGNS:  Visit Vitals  /63 (BP Location: LUE - Left upper extremity, Patient Position: Semi-Solano's)   Pulse 64   Temp 98.1 Â°F (36.7 Â°C) (Oral)   Resp 16   Ht 5' 7"" (1.702 m)   Wt 72 kg (158 lb 11.7 oz)   SpO2 98%   BMI 24.86 kg/mÂ²       LABS:  Lab Results   Component Value Date    WBC 14.0 (H) 05/16/2022    HGB 14.0 05/16/2022    HCT 40.0 05/16/2022     05/16/2022    SODIUM 142 05/16/2022    POTASSIUM 3.6 05/16/2022    CHLORIDE 104 05/16/2022    CO2 25 05/16/2022    GLUCOSE 166 (H) 05/16/2022    BUN 14 05/16/2022    CREATININE 0.90 05/16/2022    CALCIUM 8.6 05/16/2022    ALBUMIN 3.8 05/16/2022    MG 2.3 04/08/2022    BILIRUBIN 0.2 05/16/2022    ALKPT 104 05/16/2022    AST 50 (H) 05/16/2022    GPT 54 05/16/2022    PHOS 3.8 02/21/2018       WBC (K/mcL)   Date Value   05/16/2022 14.0 (H)     RBC (mil/mcL)   Date Value   05/16/2022 4.43 (L)     HCT (%)   Date Value   05/16/2022 40.0     HGB (g/dL)   Date Value   05/16/2022 14.0     PLT (K/mcL)   Date Value   05/16/2022 259     TSH (mcUnits/mL)   Date Value   01/22/2020 2.436     No results found for: T4FREE  CHOLESTEROL (mg/dL)   Date Value   01/22/2020 141     HDL (mg/dL)   Date Value   01/22/2020 64     CHOL/HDL (no units)   Date Value   01/22/2020 2.2     TRIGLYCERIDE (mg/dL)   Date Value   01/22/2020 181 (H)     CALCULATED LDL (mg/dL)   Date Value   01/22/2020 41       MEDICAL HISTORY:  Patient Active Problem List    Diagnosis Date Noted   â¢ Suicide attempt (CMS/[facility]) 05/17/2022     Priority: Low   â¢ Dizziness 05/17/2022     Priority: Low   â¢ Cocaine abuse (CMS/[facility]) 05/17/2022     Priority: Low   â¢ Depression 04/07/2022     Priority: Low   â¢ Cystic acne 10/08/2019     Priority: Low       PRIMARY CARE PROVIDER:  No " "Pcp    PSYCHIATRIC HISTORY:  Has had numerous previous hospitalizations in the past.  Had completed residential [facility] treatment through [facility]. Per review of chart, patient was living at a sober living house    SOCIAL HISTORY  Single. Was living at sober living. HABITS:  Alcohol:  Denied. Drugs:  Admits using 40 dollar worth of cocaine on a daily basis. Smoking: not asked. FAMILY PSYCHIATRIC HISTORY:  Had reported history of mental illness in his family in the past.    SUICIDE RISK ASSESSMENT  Risk Factors: substance use and previous suicide attempt. Protective Factors: access to healthcare and able to make needs known. Risk Level: low . ACCESS TO FIREARMS:  Denies. MENTAL STATUS EXAMINATION:  Lying in bed with eyes closed, appearing stated age [place] American male. Not making eye contact. No abnormal involuntary movements noted. No psychomotor agitation or slowing noted. Not very cooperative with interview questioning. Well oriented to time, place and person. Describes his mood as âtired and coat, affect congruent with stated mood, irritable. Linear conversation. No formal thought disorder noted. Attention and short-term memory appears conversationally intact. Endorses chronic suicidal thoughts but denies any plan or intent states âit might change next moment"". Insight and judgment fair based on engagement in interview and input into treatment needs. DIAGNOSIS:  Cocaine use disorder  Mood disorder  Rule out antisocial personality disorder  ASSESSMENT AND PLAN:   Upon assessment, it appears that patient has been struggling with chronic suicidal thoughts. He has tested positive for COVID. Brief inpatient psychiatric hospitalization is of minimal benefit at this point. He has had multiple recent psychiatric hospitalizations recently. Has had history of numerous presentations to the ER with similar complaints.   At this point would recommend discontinuing 1 on 1 " monitoring. Would recommend social Work to provide information on residential AODA treatment in the area. -no benefit from inpatient psychiatric hospitalization. -please consider discharge once medically stable. Thank you for allowing me to participate in the care of your patient. If you have any questions, please contact me at ([phone].

## 2024-06-24 ENCOUNTER — OUTPATIENT (OUTPATIENT)
Dept: OUTPATIENT SERVICES | Facility: HOSPITAL | Age: 89
LOS: 1 days | End: 2024-06-24

## 2024-06-24 VITALS
HEART RATE: 76 BPM | DIASTOLIC BLOOD PRESSURE: 70 MMHG | SYSTOLIC BLOOD PRESSURE: 158 MMHG | OXYGEN SATURATION: 96 % | WEIGHT: 130.07 LBS | TEMPERATURE: 98 F | RESPIRATION RATE: 14 BRPM | HEIGHT: 66 IN

## 2024-06-24 DIAGNOSIS — N13.30 UNSPECIFIED HYDRONEPHROSIS: ICD-10-CM

## 2024-06-24 DIAGNOSIS — Z90.79 ACQUIRED ABSENCE OF OTHER GENITAL ORGAN(S): Chronic | ICD-10-CM

## 2024-06-24 DIAGNOSIS — Z98.890 OTHER SPECIFIED POSTPROCEDURAL STATES: Chronic | ICD-10-CM

## 2024-06-24 RX ORDER — TAMSULOSIN HYDROCHLORIDE 0.4 MG/1
1 CAPSULE ORAL
Refills: 0 | DISCHARGE

## 2024-06-24 RX ORDER — DEXTROSE MONOHYDRATE AND SODIUM CHLORIDE 5; .3 G/100ML; G/100ML
1000 INJECTION, SOLUTION INTRAVENOUS
Refills: 0 | Status: DISCONTINUED | OUTPATIENT
Start: 2024-07-01 | End: 2024-07-15

## 2024-06-26 LAB
CULTURE RESULTS: ABNORMAL
CULTURE RESULTS: SIGNIFICANT CHANGE UP
SPECIMEN SOURCE: SIGNIFICANT CHANGE UP
SPECIMEN SOURCE: SIGNIFICANT CHANGE UP

## 2024-06-27 DIAGNOSIS — N39.0 URINARY TRACT INFECTION, SITE NOT SPECIFIED: ICD-10-CM

## 2024-06-27 LAB — CHROM ANALY INTERPHASE BLD FISH-IMP: SIGNIFICANT CHANGE UP

## 2024-06-30 ENCOUNTER — TRANSCRIPTION ENCOUNTER (OUTPATIENT)
Age: 89
End: 2024-06-30

## 2024-07-01 ENCOUNTER — APPOINTMENT (OUTPATIENT)
Dept: UROLOGY | Facility: HOSPITAL | Age: 89
End: 2024-07-01

## 2024-07-01 ENCOUNTER — RESULT REVIEW (OUTPATIENT)
Age: 89
End: 2024-07-01

## 2024-07-01 ENCOUNTER — OUTPATIENT (OUTPATIENT)
Dept: OUTPATIENT SERVICES | Facility: HOSPITAL | Age: 89
LOS: 1 days | Discharge: HOME | End: 2024-07-01
Payer: MEDICARE

## 2024-07-01 ENCOUNTER — TRANSCRIPTION ENCOUNTER (OUTPATIENT)
Age: 89
End: 2024-07-01

## 2024-07-01 VITALS
SYSTOLIC BLOOD PRESSURE: 160 MMHG | HEART RATE: 67 BPM | OXYGEN SATURATION: 98 % | RESPIRATION RATE: 16 BRPM | DIASTOLIC BLOOD PRESSURE: 56 MMHG | HEIGHT: 66 IN | TEMPERATURE: 98 F | WEIGHT: 130.07 LBS

## 2024-07-01 VITALS
HEART RATE: 84 BPM | OXYGEN SATURATION: 100 % | DIASTOLIC BLOOD PRESSURE: 58 MMHG | SYSTOLIC BLOOD PRESSURE: 151 MMHG | RESPIRATION RATE: 16 BRPM

## 2024-07-01 DIAGNOSIS — Z98.890 OTHER SPECIFIED POSTPROCEDURAL STATES: Chronic | ICD-10-CM

## 2024-07-01 DIAGNOSIS — Z90.79 ACQUIRED ABSENCE OF OTHER GENITAL ORGAN(S): Chronic | ICD-10-CM

## 2024-07-01 DIAGNOSIS — N13.30 UNSPECIFIED HYDRONEPHROSIS: ICD-10-CM

## 2024-07-01 PROCEDURE — 52354 CYSTOURETERO W/BIOPSY: CPT | Mod: RT

## 2024-07-01 PROCEDURE — 52356 CYSTO/URETERO W/LITHOTRIPSY: CPT | Mod: RT

## 2024-07-01 PROCEDURE — 52214 CYSTOSCOPY AND TREATMENT: CPT

## 2024-07-01 PROCEDURE — 88305 TISSUE EXAM BY PATHOLOGIST: CPT | Mod: 26

## 2024-07-01 DEVICE — LASER FIBER MOSES 200 D/F/L: Type: IMPLANTABLE DEVICE | Status: FUNCTIONAL

## 2024-07-01 DEVICE — URETERAL STENT PERCUFLEX PLUS 7FR 24CM: Type: IMPLANTABLE DEVICE | Status: FUNCTIONAL

## 2024-07-01 DEVICE — GWIRE ANGL .035X180 STD: Type: IMPLANTABLE DEVICE | Status: FUNCTIONAL

## 2024-07-01 DEVICE — GUIDEWIRE SENSOR DUAL-FLEX NITINOL STRAIGHT .038" X 150CM: Type: IMPLANTABLE DEVICE | Status: FUNCTIONAL

## 2024-07-01 DEVICE — URETERAL CATH OPEN END 5FR 70CM: Type: IMPLANTABLE DEVICE | Status: FUNCTIONAL

## 2024-07-01 RX ORDER — DEXTROSE MONOHYDRATE AND SODIUM CHLORIDE 5; .3 G/100ML; G/100ML
1000 INJECTION, SOLUTION INTRAVENOUS
Refills: 0 | Status: DISCONTINUED | OUTPATIENT
Start: 2024-07-01 | End: 2024-07-15

## 2024-07-01 RX ORDER — ATORVASTATIN CALCIUM 80 MG/1
1 TABLET, FILM COATED ORAL
Refills: 0 | DISCHARGE

## 2024-07-01 RX ORDER — TAMSULOSIN HYDROCHLORIDE 0.4 MG/1
1 CAPSULE ORAL
Refills: 0 | DISCHARGE

## 2024-07-01 RX ORDER — METOPROLOL TARTRATE 50 MG
1 TABLET ORAL
Refills: 0 | DISCHARGE

## 2024-07-01 RX ORDER — BIMATOPROST 0.3 MG/ML
1 SOLUTION/ DROPS OPHTHALMIC
Refills: 0 | DISCHARGE

## 2024-07-01 RX ORDER — AMLODIPINE BESYLATE 2.5 MG/1
1 TABLET ORAL
Refills: 0 | DISCHARGE

## 2024-07-04 PROBLEM — N13.30 UNSPECIFIED HYDRONEPHROSIS: Chronic | Status: ACTIVE | Noted: 2024-06-24

## 2024-07-04 PROBLEM — H40.059 OCULAR HYPERTENSION, UNSPECIFIED EYE: Chronic | Status: ACTIVE | Noted: 2024-06-24

## 2024-07-04 PROBLEM — C85.90 NON-HODGKIN LYMPHOMA, UNSPECIFIED, UNSPECIFIED SITE: Chronic | Status: ACTIVE | Noted: 2024-06-24

## 2024-07-04 PROBLEM — Z87.438 PERSONAL HISTORY OF OTHER DISEASES OF MALE GENITAL ORGANS: Chronic | Status: ACTIVE | Noted: 2024-06-24

## 2024-07-04 PROBLEM — N18.9 CHRONIC KIDNEY DISEASE, UNSPECIFIED: Chronic | Status: ACTIVE | Noted: 2024-06-24

## 2024-07-05 ENCOUNTER — APPOINTMENT (OUTPATIENT)
Dept: UROLOGY | Facility: CLINIC | Age: 89
End: 2024-07-05

## 2024-07-05 ENCOUNTER — OUTPATIENT (OUTPATIENT)
Dept: OUTPATIENT SERVICES | Facility: HOSPITAL | Age: 89
LOS: 1 days | End: 2024-07-05
Payer: COMMERCIAL

## 2024-07-05 DIAGNOSIS — R35.0 FREQUENCY OF MICTURITION: ICD-10-CM

## 2024-07-05 DIAGNOSIS — Z90.79 ACQUIRED ABSENCE OF OTHER GENITAL ORGAN(S): Chronic | ICD-10-CM

## 2024-07-05 LAB — SURGICAL PATHOLOGY STUDY: SIGNIFICANT CHANGE UP

## 2024-07-05 PROCEDURE — 51705 CHANGE OF BLADDER TUBE: CPT

## 2024-07-05 PROCEDURE — C2627: CPT

## 2024-07-09 DIAGNOSIS — R33.9 RETENTION OF URINE, UNSPECIFIED: ICD-10-CM

## 2024-07-12 ENCOUNTER — APPOINTMENT (OUTPATIENT)
Dept: UROLOGY | Facility: CLINIC | Age: 89
End: 2024-07-12

## 2024-07-12 ENCOUNTER — OUTPATIENT (OUTPATIENT)
Dept: OUTPATIENT SERVICES | Facility: HOSPITAL | Age: 89
LOS: 1 days | End: 2024-07-12
Payer: COMMERCIAL

## 2024-07-12 VITALS — HEART RATE: 69 BPM | DIASTOLIC BLOOD PRESSURE: 63 MMHG | RESPIRATION RATE: 16 BRPM | SYSTOLIC BLOOD PRESSURE: 145 MMHG

## 2024-07-12 DIAGNOSIS — R33.9 RETENTION OF URINE, UNSPECIFIED: ICD-10-CM

## 2024-07-12 DIAGNOSIS — R35.0 FREQUENCY OF MICTURITION: ICD-10-CM

## 2024-07-12 DIAGNOSIS — Z90.79 ACQUIRED ABSENCE OF OTHER GENITAL ORGAN(S): Chronic | ICD-10-CM

## 2024-07-12 PROCEDURE — 51700 IRRIGATION OF BLADDER: CPT

## 2024-07-12 PROCEDURE — 88189 FLOWCYTOMETRY/READ 16 & >: CPT | Mod: 59

## 2024-07-15 DIAGNOSIS — R33.9 RETENTION OF URINE, UNSPECIFIED: ICD-10-CM

## 2024-08-28 ENCOUNTER — APPOINTMENT (OUTPATIENT)
Dept: NEPHROLOGY | Facility: CLINIC | Age: 89
End: 2024-08-28
Payer: MEDICARE

## 2024-08-28 VITALS
HEART RATE: 66 BPM | HEIGHT: 67 IN | WEIGHT: 133.38 LBS | OXYGEN SATURATION: 97 % | BODY MASS INDEX: 20.93 KG/M2 | TEMPERATURE: 97.4 F | DIASTOLIC BLOOD PRESSURE: 62 MMHG | SYSTOLIC BLOOD PRESSURE: 134 MMHG

## 2024-08-28 DIAGNOSIS — I10 ESSENTIAL (PRIMARY) HYPERTENSION: ICD-10-CM

## 2024-08-28 DIAGNOSIS — N13.30 UNSPECIFIED HYDRONEPHROSIS: ICD-10-CM

## 2024-08-28 DIAGNOSIS — N18.30 CHRONIC KIDNEY DISEASE, STAGE 3 UNSPECIFIED: ICD-10-CM

## 2024-08-28 PROCEDURE — G2211 COMPLEX E/M VISIT ADD ON: CPT

## 2024-08-28 PROCEDURE — 99213 OFFICE O/P EST LOW 20 MIN: CPT

## 2024-08-29 LAB
CREAT SPEC-SCNC: 49 MG/DL
CREAT SPEC-SCNC: 49 MG/DL
CREAT/PROT UR: 1.1 RATIO
MICROALBUMIN 24H UR DL<=1MG/L-MCNC: 23.3 MG/DL
MICROALBUMIN/CREAT 24H UR-RTO: 479 MG/G
PROT UR-MCNC: 54 MG/DL

## 2024-08-29 NOTE — HISTORY OF PRESENT ILLNESS
[FreeTextEntry1] : SAL GUTIÉRREZ is a 89 year male with a history of HTN, HLD, CKD, BPH and SCC. He presented to [facility] on May 10th with c/o flank pain and Nephrology was consulted for LIA on CKD, hyperkalemia, and metabolic acidosis. Discharged on 5/17. Here today for post hospitalization follow up with his sister whom he lives with. Togolese speaking but understand a good amount of English. Sister translates at times.  No labs available prior to this admission on [place] [facility]. As per pt. his Scr normally ranges between 1.7 to 2. Scr on admission was elevated at 5.06 (5/10). Serum potassium elevated at 5.6. SCO2 low at 16.  As per pt, he has had a kidney US in the past (>10 years ago) and at that time was normal. CT abd/pelv on admission to [facility] showed soft tissue filling defect in the right mid/distal ureter, measuring 1.5 cm, possibly neoplasm, resultant moderate right hydroureteronephrosis, and significant retroperitoneal mass. PCN on 5/12 and with that serum creatinine improved. He was seen by Dr. Yun yesterday for HFU, biopsy next week.  Dr. Trujillo as well and scheduled for PET scan next week.  Castro was removed yesterday and now voiding without difficulty. Right PCN remains in place and draining clear yellow urine. [facility] and PT in place.   August: comes for a follow up visit.   Rt PCN removed a couple of months ago. Still with a stent  He received treatment for Lymphoma ( ?immunotherapy) . Received T/T for 4 weeks Now completed.  PET scan scheduled for 9/23. Further management based on CT scan  Has been measuring BP at home and is well controlled

## 2024-08-29 NOTE — ASSESSMENT
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   LIA on CKD: Pt. 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 Still with a ureteral stent. Now with resolution of LIA and kidney function at baseline with serum creatinine down to 1.7   Labs reviewed: BUN/Cr: 48/1.7 mg/dl K stable. Bicarb 22 Avoid NSAIDs.  Maintain adequate hydration.  Urine proteinuria of 1 gm with albuminuria of 479 mg : monitor for now  HTN: BP at goal. Continue Amlodipine 5mg and Metoprolol 50mg daily.  Volume status: No edema. Lungs clear.      F/U in 6 months  Dr. Yun - urologist Dr. Trujillo- oncologist

## 2024-08-29 NOTE — PHYSICAL EXAM
[General Appearance - Alert] : alert [General Appearance - In No Acute Distress] : in no acute distress [General Appearance - Well Nourished] : well nourished [Sclera] : the sclera and conjunctiva were normal [Outer Ear] : the ears and nose were normal in appearance [Neck Appearance] : the appearance of the neck was normal [Jugular Venous Distention Increased] : there was no jugular-venous distention [Respiration, Rhythm And Depth] : normal respiratory rhythm and effort [Auscultation Breath Sounds / Voice Sounds] : lungs were clear to auscultation bilaterally [Heart Rate And Rhythm] : heart rate was normal and rhythm regular [Heart Sounds] : normal S1 and S2 [Heart Sounds Gallop] : no gallops [Murmurs] : no murmurs [Heart Sounds Pericardial Friction Rub] : no pericardial rub [Edema] : there was no peripheral edema [Bowel Sounds] : normal bowel sounds [Abdomen Soft] : soft [Involuntary Movements] : no involuntary movements were seen [] : no rash [No Focal Deficits] : no focal deficits [Oriented To Time, Place, And Person] : oriented to person, place, and time [Impaired Insight] : insight and judgment were intact [Affect] : the affect was normal [FreeTextEntry1] : Right PCN in place

## 2024-08-29 NOTE — HISTORY OF PRESENT ILLNESS
[FreeTextEntry1] : SAL GUTIÉRREZ is a 89 year male with a history of HTN, HLD, CKD, BPH and SCC. He presented to [facility] on May 10th with c/o flank pain and Nephrology was consulted for LIA on CKD, hyperkalemia, and metabolic acidosis. Discharged on 5/17. Here today for post hospitalization follow up with his sister whom he lives with. Hungarian speaking but understand a good amount of English. Sister translates at times.  No labs available prior to this admission on [place] [facility]. As per pt. his Scr normally ranges between 1.7 to 2. Scr on admission was elevated at 5.06 (5/10). Serum potassium elevated at 5.6. SCO2 low at 16.  As per pt, he has had a kidney US in the past (>10 years ago) and at that time was normal. CT abd/pelv on admission to [facility] showed soft tissue filling defect in the right mid/distal ureter, measuring 1.5 cm, possibly neoplasm, resultant moderate right hydroureteronephrosis, and significant retroperitoneal mass. PCN on 5/12 and with that serum creatinine improved. He was seen by Dr. Yun yesterday for HFU, biopsy next week.  Dr. Trujillo as well and scheduled for PET scan next week.  Castro was removed yesterday and now voiding without difficulty. Right PCN remains in place and draining clear yellow urine. [facility] and PT in place.   August: comes for a follow up visit.   Rt PCN removed a couple of months ago. Still with a stent  He received treatment for Lymphoma ( ?immunotherapy) . Received T/T for 4 weeks Now completed.  PET scan scheduled for 9/23. Further management based on CT scan  Has been measuring BP at home and is well controlled

## 2024-08-29 NOTE — HISTORY OF PRESENT ILLNESS
[FreeTextEntry1] : SAL GUTIÉRREZ is a 89 year male with a history of HTN, HLD, CKD, BPH and SCC. He presented to [facility] on May 10th with c/o flank pain and Nephrology was consulted for LIA on CKD, hyperkalemia, and metabolic acidosis. Discharged on 5/17. Here today for post hospitalization follow up with his sister whom he lives with. [place]ian speaking but understand a good amount of English. Sister translates at times.  No labs available prior to this admission on [place] [facility]. As per pt. his Scr normally ranges between 1.7 to 2. Scr on admission was elevated at 5.06 (5/10). Serum potassium elevated at 5.6. SCO2 low at 16.  As per pt, he has had a kidney US in the past (>10 years ago) and at that time was normal. CT abd/pelv on admission to [facility] showed soft tissue filling defect in the right mid/distal ureter, measuring 1.5 cm, possibly neoplasm, resultant moderate right hydroureteronephrosis, and significant retroperitoneal mass. PCN on 5/12 and with that serum creatinine improved. He was seen by Dr. uYn yesterday for HFU, biopsy next week.  Dr. Trujillo as well and scheduled for PET scan next week.  Castro was removed yesterday and now voiding without difficulty. Right PCN remains in place and draining clear yellow urine. [facility] Home[facility] and PT in place.   August: comes for a follow up visit.   Rt PCN removed a couple of months ago. Still with a stent  He received treatment for Lymphoma ( ?immunotherapy) . Received T/T for 4 weeks Now completed.  PET scan scheduled for 9/23. Further management based on CT scan  Has been measuring BP at home and is well controlled

## 2024-08-29 NOTE — REASON FOR VISIT
[Follow-Up] : a follow-up visit [Other: _____] : [unfilled] [FreeTextEntry1] : LIA on CKD, hyperkalemia, and metabolic acidosis

## 2024-11-26 ENCOUNTER — NON-APPOINTMENT (OUTPATIENT)
Age: 89
End: 2024-11-26

## 2024-11-26 ENCOUNTER — OUTPATIENT (OUTPATIENT)
Dept: OUTPATIENT SERVICES | Facility: HOSPITAL | Age: 89
LOS: 1 days | End: 2024-11-26
Payer: COMMERCIAL

## 2024-11-26 ENCOUNTER — APPOINTMENT (OUTPATIENT)
Dept: UROLOGY | Facility: CLINIC | Age: 89
End: 2024-11-26
Payer: MEDICARE

## 2024-11-26 VITALS — SYSTOLIC BLOOD PRESSURE: 156 MMHG | DIASTOLIC BLOOD PRESSURE: 69 MMHG | TEMPERATURE: 97.2 F | HEART RATE: 76 BPM

## 2024-11-26 DIAGNOSIS — Z90.79 ACQUIRED ABSENCE OF OTHER GENITAL ORGAN(S): Chronic | ICD-10-CM

## 2024-11-26 DIAGNOSIS — N13.30 UNSPECIFIED HYDRONEPHROSIS: ICD-10-CM

## 2024-11-26 PROCEDURE — 76775 US EXAM ABDO BACK WALL LIM: CPT | Mod: 26

## 2024-11-26 PROCEDURE — 76775 US EXAM ABDO BACK WALL LIM: CPT

## 2024-11-26 PROCEDURE — 99213 OFFICE O/P EST LOW 20 MIN: CPT

## 2024-11-27 DIAGNOSIS — N13.30 UNSPECIFIED HYDRONEPHROSIS: ICD-10-CM

## 2024-11-27 LAB
APPEARANCE: ABNORMAL
BACTERIA: ABNORMAL /HPF
BILIRUBIN URINE: NEGATIVE
BLOOD URINE: ABNORMAL
CAST: 4 /LPF
COLOR: YELLOW
EPITHELIAL CELLS: 1 /HPF
GLUCOSE QUALITATIVE U: NEGATIVE MG/DL
KETONES URINE: NEGATIVE MG/DL
LEUKOCYTE ESTERASE URINE: ABNORMAL
MICROSCOPIC-UA: NORMAL
NITRITE URINE: POSITIVE
PH URINE: 6
PROTEIN URINE: 100 MG/DL
RED BLOOD CELLS URINE: 34 /HPF
SPECIFIC GRAVITY URINE: 1.01
UROBILINOGEN URINE: 0.2 MG/DL
WHITE BLOOD CELLS URINE: >998 /HPF

## 2025-04-30 ENCOUNTER — APPOINTMENT (OUTPATIENT)
Dept: NEPHROLOGY | Facility: CLINIC | Age: 89
End: 2025-04-30
Payer: MEDICARE

## 2025-04-30 VITALS
HEIGHT: 67 IN | BODY MASS INDEX: 21.03 KG/M2 | HEART RATE: 69 BPM | SYSTOLIC BLOOD PRESSURE: 137 MMHG | TEMPERATURE: 98.2 F | WEIGHT: 134 LBS | DIASTOLIC BLOOD PRESSURE: 71 MMHG | OXYGEN SATURATION: 97 %

## 2025-04-30 DIAGNOSIS — E87.5 HYPERKALEMIA: ICD-10-CM

## 2025-04-30 DIAGNOSIS — N18.30 CHRONIC KIDNEY DISEASE, STAGE 3 UNSPECIFIED: ICD-10-CM

## 2025-04-30 DIAGNOSIS — I10 ESSENTIAL (PRIMARY) HYPERTENSION: ICD-10-CM

## 2025-04-30 DIAGNOSIS — E87.20 ACIDOSIS, UNSPECIFIED: ICD-10-CM

## 2025-04-30 PROCEDURE — G2211 COMPLEX E/M VISIT ADD ON: CPT

## 2025-04-30 PROCEDURE — 99213 OFFICE O/P EST LOW 20 MIN: CPT

## 2025-05-01 ENCOUNTER — NON-APPOINTMENT (OUTPATIENT)
Age: 89
End: 2025-05-01

## 2025-05-01 LAB
25(OH)D3 SERPL-MCNC: 35 NG/ML
ALBUMIN SERPL ELPH-MCNC: 4 G/DL
ANION GAP SERPL CALC-SCNC: 17 MMOL/L
APPEARANCE: ABNORMAL
BACTERIA: ABNORMAL /HPF
BASOPHILS # BLD AUTO: 0.06 K/UL
BASOPHILS NFR BLD AUTO: 0.8 %
BILIRUBIN URINE: NEGATIVE
BLOOD URINE: ABNORMAL
BUN SERPL-MCNC: 47 MG/DL
CALCIUM SERPL-MCNC: 9.6 MG/DL
CALCIUM SERPL-MCNC: 9.6 MG/DL
CAST: 2 /LPF
CHLORIDE SERPL-SCNC: 106 MMOL/L
CK SERPL-CCNC: 82 U/L
CO2 SERPL-SCNC: 16 MMOL/L
COLOR: YELLOW
CREAT SERPL-MCNC: 2.17 MG/DL
CREAT SERPL-MCNC: 2.17 MG/DL
CREAT SPEC-SCNC: 35 MG/DL
CREAT/PROT UR: 1.4 RATIO
CYSTATIN C SERPL-MCNC: 2.28 MG/L
EGFRCR SERPLBLD CKD-EPI 2021: 29 ML/MIN/1.73M2
EGFRCR SERPLBLD CKD-EPI 2021: 29 ML/MIN/1.73M2
EGFRCR-CYS SERPLBLD CKD-EPI 2021: 26 ML/MIN/1.73M2
EOSINOPHIL # BLD AUTO: 0.35 K/UL
EOSINOPHIL NFR BLD AUTO: 4.5 %
EPITHELIAL CELLS: 1 /HPF
GFR/BSA.PRED SERPLBLD CYS-BASED-ARV: 23 ML/MIN/1.73M2
GLUCOSE QUALITATIVE U: NEGATIVE MG/DL
GLUCOSE SERPL-MCNC: 110 MG/DL
HCT VFR BLD CALC: 43.3 %
HGB BLD-MCNC: 14 G/DL
IMM GRANULOCYTES NFR BLD AUTO: 0.1 %
KETONES URINE: NEGATIVE MG/DL
LEUKOCYTE ESTERASE URINE: ABNORMAL
LYMPHOCYTES # BLD AUTO: 0.84 K/UL
LYMPHOCYTES NFR BLD AUTO: 10.8 %
MAGNESIUM SERPL-MCNC: 2.3 MG/DL
MAN DIFF?: NORMAL
MCHC RBC-ENTMCNC: 28.9 PG
MCHC RBC-ENTMCNC: 32.3 G/DL
MCV RBC AUTO: 89.3 FL
MICROSCOPIC-UA: NORMAL
MONOCYTES # BLD AUTO: 0.71 K/UL
MONOCYTES NFR BLD AUTO: 9.1 %
NEUTROPHILS # BLD AUTO: 5.81 K/UL
NEUTROPHILS NFR BLD AUTO: 74.7 %
NITRITE URINE: NEGATIVE
PARATHYROID HORMONE INTACT: 37 PG/ML
PH URINE: 6
PHOSPHATE SERPL-MCNC: 3.5 MG/DL
PLATELET # BLD AUTO: 197 K/UL
POTASSIUM SERPL-SCNC: 4.6 MMOL/L
PROT UR-MCNC: 48 MG/DL
PROTEIN URINE: 30 MG/DL
RBC # BLD: 4.85 M/UL
RBC # FLD: 14.2 %
RED BLOOD CELLS URINE: 6 /HPF
REVIEW: NORMAL
SODIUM SERPL-SCNC: 140 MMOL/L
SPECIFIC GRAVITY URINE: 1.01
UROBILINOGEN URINE: 0.2 MG/DL
WBC # FLD AUTO: 7.78 K/UL
WHITE BLOOD CELLS URINE: 687 /HPF

## 2025-06-03 ENCOUNTER — APPOINTMENT (OUTPATIENT)
Dept: UROLOGY | Facility: CLINIC | Age: 89
End: 2025-06-03
Payer: MEDICARE

## 2025-06-03 DIAGNOSIS — R39.9 UNSPECIFIED SYMPTOMS AND SIGNS INVOLVING THE GENITOURINARY SYSTEM: ICD-10-CM

## 2025-06-03 PROCEDURE — 99213 OFFICE O/P EST LOW 20 MIN: CPT

## 2025-06-03 PROCEDURE — G2211 COMPLEX E/M VISIT ADD ON: CPT

## 2025-06-07 LAB — BACTERIA UR CULT: ABNORMAL

## 2025-06-07 RX ORDER — LEVOFLOXACIN 500 MG/1
500 TABLET, FILM COATED ORAL DAILY
Qty: 7 | Refills: 0 | Status: ACTIVE | COMMUNITY
Start: 2025-06-07 | End: 1900-01-01

## 2025-06-09 ENCOUNTER — NON-APPOINTMENT (OUTPATIENT)
Age: 89
End: 2025-06-09

## 2025-06-25 ENCOUNTER — OUTPATIENT (OUTPATIENT)
Dept: OUTPATIENT SERVICES | Facility: HOSPITAL | Age: 89
LOS: 1 days | End: 2025-06-25
Payer: COMMERCIAL

## 2025-06-25 ENCOUNTER — APPOINTMENT (OUTPATIENT)
Dept: UROLOGY | Facility: CLINIC | Age: 89
End: 2025-06-25
Payer: MEDICARE

## 2025-06-25 ENCOUNTER — APPOINTMENT (OUTPATIENT)
Dept: RADIOLOGY | Facility: IMAGING CENTER | Age: 89
End: 2025-06-25
Payer: MEDICARE

## 2025-06-25 VITALS — HEART RATE: 73 BPM | DIASTOLIC BLOOD PRESSURE: 66 MMHG | SYSTOLIC BLOOD PRESSURE: 151 MMHG

## 2025-06-25 DIAGNOSIS — Z90.79 ACQUIRED ABSENCE OF OTHER GENITAL ORGAN(S): Chronic | ICD-10-CM

## 2025-06-25 DIAGNOSIS — N13.30 UNSPECIFIED HYDRONEPHROSIS: ICD-10-CM

## 2025-06-25 DIAGNOSIS — R35.0 FREQUENCY OF MICTURITION: ICD-10-CM

## 2025-06-25 PROCEDURE — 52000 CYSTOURETHROSCOPY: CPT

## 2025-06-25 PROCEDURE — 74018 RADEX ABDOMEN 1 VIEW: CPT

## 2025-06-25 PROCEDURE — 74018 RADEX ABDOMEN 1 VIEW: CPT | Mod: 26

## 2025-06-27 DIAGNOSIS — N13.30 UNSPECIFIED HYDRONEPHROSIS: ICD-10-CM

## 2025-06-28 LAB — BACTERIA UR CULT: ABNORMAL

## 2025-06-30 RX ORDER — FOSFOMYCIN TROMETHAMINE 3 G/1
3 POWDER ORAL
Qty: 3 | Refills: 0 | Status: ACTIVE | COMMUNITY
Start: 2025-06-30 | End: 1900-01-01

## 2025-07-16 ENCOUNTER — OUTPATIENT (OUTPATIENT)
Dept: OUTPATIENT SERVICES | Facility: HOSPITAL | Age: 89
LOS: 1 days | End: 2025-07-16
Payer: COMMERCIAL

## 2025-07-16 ENCOUNTER — APPOINTMENT (OUTPATIENT)
Dept: UROLOGY | Facility: CLINIC | Age: 89
End: 2025-07-16
Payer: MEDICARE

## 2025-07-16 VITALS
RESPIRATION RATE: 15 BRPM | OXYGEN SATURATION: 99 % | DIASTOLIC BLOOD PRESSURE: 65 MMHG | SYSTOLIC BLOOD PRESSURE: 160 MMHG | HEART RATE: 76 BPM

## 2025-07-16 DIAGNOSIS — R35.0 FREQUENCY OF MICTURITION: ICD-10-CM

## 2025-07-16 DIAGNOSIS — Z90.79 ACQUIRED ABSENCE OF OTHER GENITAL ORGAN(S): Chronic | ICD-10-CM

## 2025-07-16 PROBLEM — Z96.0 URETERAL STENT RETAINED: Status: ACTIVE | Noted: 2025-07-16

## 2025-07-16 PROCEDURE — 52000 CYSTOURETHROSCOPY: CPT

## 2025-07-17 DIAGNOSIS — Z96.0 PRESENCE OF UROGENITAL IMPLANTS: ICD-10-CM

## 2025-07-22 ENCOUNTER — TRANSCRIPTION ENCOUNTER (OUTPATIENT)
Age: 89
End: 2025-07-22

## 2025-07-22 LAB — BACTERIA UR CULT: ABNORMAL

## 2025-07-22 RX ORDER — SULFAMETHOXAZOLE AND TRIMETHOPRIM 800; 160 MG/1; MG/1
800-160 TABLET ORAL TWICE DAILY
Qty: 20 | Refills: 0 | Status: ACTIVE | COMMUNITY
Start: 2025-07-22 | End: 1900-01-01

## 2025-07-24 ENCOUNTER — OUTPATIENT (OUTPATIENT)
Dept: OUTPATIENT SERVICES | Facility: HOSPITAL | Age: 89
LOS: 1 days | End: 2025-07-24

## 2025-07-24 VITALS
HEART RATE: 67 BPM | TEMPERATURE: 98 F | WEIGHT: 119.05 LBS | OXYGEN SATURATION: 99 % | HEIGHT: 65 IN | SYSTOLIC BLOOD PRESSURE: 150 MMHG | RESPIRATION RATE: 16 BRPM | DIASTOLIC BLOOD PRESSURE: 70 MMHG

## 2025-07-24 DIAGNOSIS — Z96.0 PRESENCE OF UROGENITAL IMPLANTS: ICD-10-CM

## 2025-07-24 DIAGNOSIS — I10 ESSENTIAL (PRIMARY) HYPERTENSION: ICD-10-CM

## 2025-07-24 DIAGNOSIS — Z96.0 PRESENCE OF UROGENITAL IMPLANTS: Chronic | ICD-10-CM

## 2025-07-24 DIAGNOSIS — Z90.79 ACQUIRED ABSENCE OF OTHER GENITAL ORGAN(S): Chronic | ICD-10-CM

## 2025-08-11 ENCOUNTER — APPOINTMENT (OUTPATIENT)
Dept: UROLOGY | Facility: AMBULATORY SURGERY CENTER | Age: 89
End: 2025-08-11

## 2025-08-14 ENCOUNTER — APPOINTMENT (OUTPATIENT)
Dept: UROLOGY | Facility: HOSPITAL | Age: 89
End: 2025-08-14

## 2025-08-14 ENCOUNTER — OUTPATIENT (OUTPATIENT)
Dept: OUTPATIENT SERVICES | Facility: HOSPITAL | Age: 89
LOS: 1 days | End: 2025-08-14
Payer: MEDICARE

## 2025-08-14 ENCOUNTER — TRANSCRIPTION ENCOUNTER (OUTPATIENT)
Age: 89
End: 2025-08-14

## 2025-08-14 VITALS
OXYGEN SATURATION: 100 % | SYSTOLIC BLOOD PRESSURE: 145 MMHG | DIASTOLIC BLOOD PRESSURE: 64 MMHG | HEART RATE: 57 BPM | RESPIRATION RATE: 16 BRPM

## 2025-08-14 VITALS
RESPIRATION RATE: 19 BRPM | WEIGHT: 119.05 LBS | DIASTOLIC BLOOD PRESSURE: 60 MMHG | TEMPERATURE: 98 F | OXYGEN SATURATION: 98 % | SYSTOLIC BLOOD PRESSURE: 128 MMHG | HEIGHT: 65 IN | HEART RATE: 65 BPM

## 2025-08-14 DIAGNOSIS — Z90.79 ACQUIRED ABSENCE OF OTHER GENITAL ORGAN(S): Chronic | ICD-10-CM

## 2025-08-14 DIAGNOSIS — Z96.0 PRESENCE OF UROGENITAL IMPLANTS: Chronic | ICD-10-CM

## 2025-08-14 DIAGNOSIS — Z96.0 PRESENCE OF UROGENITAL IMPLANTS: ICD-10-CM

## 2025-08-14 PROCEDURE — 52310 CYSTOSCOPY AND TREATMENT: CPT | Mod: RT

## 2025-08-14 RX ORDER — METOPROLOL SUCCINATE 50 MG/1
50 TABLET, EXTENDED RELEASE ORAL
Refills: 0 | DISCHARGE

## 2025-08-14 RX ORDER — ATORVASTATIN CALCIUM 80 MG/1
1 TABLET, FILM COATED ORAL
Refills: 0 | DISCHARGE

## 2025-08-14 RX ORDER — ASPIRIN 325 MG
0 TABLET ORAL
Refills: 0 | DISCHARGE

## 2025-08-14 RX ORDER — OXYCODONE HYDROCHLORIDE 30 MG/1
5 TABLET ORAL ONCE
Refills: 0 | Status: DISCONTINUED | OUTPATIENT
Start: 2025-08-14 | End: 2025-08-14

## 2025-08-14 RX ORDER — TAMSULOSIN HYDROCHLORIDE 0.4 MG/1
1 CAPSULE ORAL
Refills: 0 | DISCHARGE

## 2025-08-14 RX ORDER — HYDROMORPHONE/SOD CHLOR,ISO/PF 2 MG/10 ML
0.5 SYRINGE (ML) INJECTION
Refills: 0 | Status: DISCONTINUED | OUTPATIENT
Start: 2025-08-14 | End: 2025-08-14

## 2025-08-14 RX ORDER — AMLODIPINE BESYLATE 10 MG/1
1 TABLET ORAL
Refills: 0 | DISCHARGE

## 2025-08-14 RX ORDER — ACETAMINOPHEN 500 MG/5ML
1000 LIQUID (ML) ORAL ONCE
Refills: 0 | Status: COMPLETED | OUTPATIENT
Start: 2025-08-14 | End: 2025-08-14

## 2025-08-14 RX ORDER — SULFAMETHOXAZOLE/TRIMETHOPRIM 800-160 MG
0 TABLET ORAL
Refills: 0 | DISCHARGE

## 2025-08-14 RX ORDER — ONDANSETRON HCL/PF 4 MG/2 ML
4 VIAL (ML) INJECTION ONCE
Refills: 0 | Status: ACTIVE | OUTPATIENT
Start: 2025-08-14 | End: 2026-07-13

## 2025-08-14 RX ORDER — FENTANYL CITRATE-0.9 % NACL/PF 100MCG/2ML
50 SYRINGE (ML) INTRAVENOUS ONCE
Refills: 0 | Status: DISCONTINUED | OUTPATIENT
Start: 2025-08-14 | End: 2025-08-14

## 2025-08-14 RX ORDER — BIMATOPROST 0.3 MG/ML
1 SOLUTION/ DROPS OPHTHALMIC
Refills: 0 | DISCHARGE

## 2025-08-14 RX ORDER — SODIUM CHLORIDE 9 G/1000ML
1000 INJECTION, SOLUTION INTRAVENOUS
Refills: 0 | Status: ACTIVE | OUTPATIENT
Start: 2025-08-14 | End: 2026-07-10

## 2025-08-14 RX ORDER — ASPIRIN 325 MG
1 TABLET ORAL
Refills: 0 | DISCHARGE

## 2025-08-14 RX ORDER — METOPROLOL SUCCINATE 50 MG/1
1 TABLET, EXTENDED RELEASE ORAL
Refills: 0 | DISCHARGE

## 2025-08-14 RX ADMIN — Medication 400 MILLIGRAM(S): at 16:49

## (undated) DEVICE — CLAMP ALLIGATOR (SINGLE JAW) 3FR X 65CM

## (undated) DEVICE — PROTECTOR HEEL / ELBOW FLUFFY

## (undated) DEVICE — VENODYNE/SCD SLEEVE CALF MEDIUM

## (undated) DEVICE — PACK CYSTO

## (undated) DEVICE — IRR BULB PATHFINDER + 10"

## (undated) DEVICE — DRAPE C ARM UNIVERSAL

## (undated) DEVICE — GLV 8 PROTEXIS (CREAM) NEU-THERA

## (undated) DEVICE — TUBING LEVEL ONE NORMOFLO SET

## (undated) DEVICE — SOL IRR POUR H2O 1500ML

## (undated) DEVICE — CANISTER DISPOSABLE THIN WALL 3000CC

## (undated) DEVICE — TUBING THERMADX UROLOGY

## (undated) DEVICE — DRSG CURITY GAUZE SPONGE 4 X 4" 12-PLY

## (undated) DEVICE — DRAPE COVER SNAP 36X30"

## (undated) DEVICE — FOLEY HOLDER STATLOCK 2 WAY ADULT

## (undated) DEVICE — FOLEY CATH 2-WAY 18FR 5CC SILASTIC

## (undated) DEVICE — ELCTR HF RESECTION LOOP 24FR 30 DEG 0.35 WIRE

## (undated) DEVICE — SOL IRR BAG NS 0.9% 3000ML

## (undated) DEVICE — WARMING BLANKET UPPER ADULT

## (undated) DEVICE — BAG URINE W METER 2L

## (undated) DEVICE — POSITIONER STRAP ARMBOARD VELCRO TS-30

## (undated) DEVICE — ADAPTER CHECK FLO 9FR STERILE

## (undated) DEVICE — CLAMP BX CUP 3FRX115CM OVL 85CM